# Patient Record
Sex: FEMALE | Race: WHITE | NOT HISPANIC OR LATINO | Employment: OTHER | URBAN - METROPOLITAN AREA
[De-identification: names, ages, dates, MRNs, and addresses within clinical notes are randomized per-mention and may not be internally consistent; named-entity substitution may affect disease eponyms.]

---

## 2017-01-03 RX ORDER — LEVOTHYROXINE SODIUM 0.1 MG/1
TABLET ORAL
Qty: 90 TABLET | Refills: 0 | Status: SHIPPED | OUTPATIENT
Start: 2017-01-03 | End: 2017-05-07 | Stop reason: SDUPTHER

## 2017-05-08 RX ORDER — LEVOTHYROXINE SODIUM 0.1 MG/1
100 TABLET ORAL DAILY
Qty: 30 TABLET | Refills: 0 | Status: SHIPPED | OUTPATIENT
Start: 2017-05-08 | End: 2017-07-26 | Stop reason: SDUPTHER

## 2017-06-21 ENCOUNTER — OFFICE VISIT (OUTPATIENT)
Dept: FAMILY MEDICINE CLINIC | Facility: CLINIC | Age: 82
End: 2017-06-21

## 2017-06-21 VITALS
WEIGHT: 126 LBS | HEART RATE: 86 BPM | HEIGHT: 63 IN | DIASTOLIC BLOOD PRESSURE: 74 MMHG | OXYGEN SATURATION: 91 % | SYSTOLIC BLOOD PRESSURE: 150 MMHG | TEMPERATURE: 98.2 F | RESPIRATION RATE: 16 BRPM | BODY MASS INDEX: 22.32 KG/M2

## 2017-06-21 DIAGNOSIS — M15.9 GENERALIZED OSTEOARTHRITIS: ICD-10-CM

## 2017-06-21 DIAGNOSIS — J30.89 PERENNIAL ALLERGIC RHINITIS, UNSPECIFIED ALLERGIC RHINITIS TRIGGER: ICD-10-CM

## 2017-06-21 DIAGNOSIS — Z00.00 HEALTH CARE MAINTENANCE: ICD-10-CM

## 2017-06-21 DIAGNOSIS — E03.9 ACQUIRED HYPOTHYROIDISM: Primary | ICD-10-CM

## 2017-06-21 DIAGNOSIS — Z79.899 HIGH RISK MEDICATION USE: ICD-10-CM

## 2017-06-21 LAB
ALBUMIN SERPL-MCNC: 4.2 G/DL (ref 3.5–5.2)
ALBUMIN/GLOB SERPL: 1.5 G/DL
ALP SERPL-CCNC: 97 U/L (ref 39–117)
ALT SERPL-CCNC: 10 U/L (ref 1–33)
AST SERPL-CCNC: 22 U/L (ref 1–32)
BASOPHILS # BLD AUTO: 0.06 10*3/MM3 (ref 0–0.2)
BASOPHILS NFR BLD AUTO: 0.7 % (ref 0–1.5)
BILIRUB SERPL-MCNC: 0.4 MG/DL (ref 0.1–1.2)
BUN SERPL-MCNC: 16 MG/DL (ref 8–23)
BUN/CREAT SERPL: 21.6 (ref 7–25)
CALCIUM SERPL-MCNC: 10 MG/DL (ref 8.2–9.6)
CHLORIDE SERPL-SCNC: 101 MMOL/L (ref 98–107)
CO2 SERPL-SCNC: 26 MMOL/L (ref 22–29)
CREAT SERPL-MCNC: 0.74 MG/DL (ref 0.57–1)
EOSINOPHIL # BLD AUTO: 0.07 10*3/MM3 (ref 0–0.7)
EOSINOPHIL NFR BLD AUTO: 0.8 % (ref 0.3–6.2)
ERYTHROCYTE [DISTWIDTH] IN BLOOD BY AUTOMATED COUNT: 14.7 % (ref 11.7–13)
GLOBULIN SER CALC-MCNC: 2.8 GM/DL
GLUCOSE SERPL-MCNC: 93 MG/DL (ref 65–99)
HCT VFR BLD AUTO: 40 % (ref 35.6–45.5)
HGB BLD-MCNC: 13.1 G/DL (ref 11.9–15.5)
IMM GRANULOCYTES # BLD: 0.03 10*3/MM3 (ref 0–0.03)
IMM GRANULOCYTES NFR BLD: 0.4 % (ref 0–0.5)
LYMPHOCYTES # BLD AUTO: 1.32 10*3/MM3 (ref 0.9–4.8)
LYMPHOCYTES NFR BLD AUTO: 15.6 % (ref 19.6–45.3)
MCH RBC QN AUTO: 32.1 PG (ref 26.9–32)
MCHC RBC AUTO-ENTMCNC: 32.8 G/DL (ref 32.4–36.3)
MCV RBC AUTO: 98 FL (ref 80.5–98.2)
MONOCYTES # BLD AUTO: 0.91 10*3/MM3 (ref 0.2–1.2)
MONOCYTES NFR BLD AUTO: 10.8 % (ref 5–12)
NEUTROPHILS # BLD AUTO: 6.06 10*3/MM3 (ref 1.9–8.1)
NEUTROPHILS NFR BLD AUTO: 71.7 % (ref 42.7–76)
PLATELET # BLD AUTO: 235 10*3/MM3 (ref 140–500)
POTASSIUM SERPL-SCNC: 4 MMOL/L (ref 3.5–5.2)
PROT SERPL-MCNC: 7 G/DL (ref 6–8.5)
RBC # BLD AUTO: 4.08 10*6/MM3 (ref 3.9–5.2)
SODIUM SERPL-SCNC: 143 MMOL/L (ref 136–145)
T4 FREE SERPL-MCNC: 1.5 NG/DL (ref 0.93–1.7)
TSH SERPL DL<=0.005 MIU/L-ACNC: 1.07 MIU/ML (ref 0.27–4.2)
WBC # BLD AUTO: 8.45 10*3/MM3 (ref 4.5–10.7)

## 2017-06-21 PROCEDURE — 99214 OFFICE O/P EST MOD 30 MIN: CPT | Performed by: FAMILY MEDICINE

## 2017-06-21 NOTE — PROGRESS NOTES
LISE Mayberry is a 89 y.o. female who is here for follow up of hypothyroidism and generalized arthritis.  Continues with allergy symptoms occasionally causing fluid buildup in her ears.  Otherwise denies any new complaints.  Does have concerns about general psychosocial problems etc.      Review of Systems   Endocrine: Positive for polyuria.   Musculoskeletal: Positive for arthralgias.   Allergic/Immunologic: Positive for environmental allergies.   All other systems reviewed and are negative.        No past medical history on file.    No past surgical history on file.    No family history on file.    Social History     Social History   • Marital status: Single     Spouse name: N/A   • Number of children: N/A   • Years of education: N/A     Occupational History   • Not on file.     Social History Main Topics   • Smoking status: Not on file   • Smokeless tobacco: Not on file   • Alcohol use Not on file   • Drug use: Not on file   • Sexual activity: Not on file     Other Topics Concern   • Not on file     Social History Narrative         Physical Exam   Constitutional: She is oriented to person, place, and time. She appears well-developed and well-nourished. No distress.   HENT:   Head: Normocephalic.   Right Ear: Hearing, tympanic membrane and ear canal normal.   Left Ear: Hearing, tympanic membrane and ear canal normal.   Mouth/Throat: Oropharynx is clear and moist.   Eyes: Conjunctivae are normal. Pupils are equal, round, and reactive to light.   Neck: Normal range of motion. Neck supple. No JVD present. No thyromegaly present.   Cardiovascular: Normal rate, regular rhythm and normal heart sounds.    Pulmonary/Chest: Effort normal. No respiratory distress. She has no rales.   Abdominal: Soft. She exhibits no distension. There is no tenderness.   Musculoskeletal: She exhibits deformity. She exhibits no edema.   Osteoarthritic changes   Neurological: She is alert and oriented to person, place, and time. She  exhibits normal muscle tone. Coordination normal.   Skin: Skin is warm and dry.   Psychiatric: She has a normal mood and affect. Her behavior is normal. Judgment and thought content normal.   Nursing note and vitals reviewed.        Assessment/Plan    Bonnie was seen today for hypothyroidism and osteoarthritis.    Diagnoses and all orders for this visit:    Acquired hypothyroidism  -     TSH+Free T4    Generalized osteoarthritis    Health care maintenance  -     CBC & Differential  -     Comprehensive Metabolic Panel    Perennial allergic rhinitis, unspecified allergic rhinitis trigger        Patient is here for routine follow-up of above-noted medical problems.  Overall status seems stable on current medications.  Will continue current therapy including follow-up in 6 months.

## 2017-07-26 RX ORDER — LEVOTHYROXINE SODIUM 0.1 MG/1
TABLET ORAL
Qty: 30 TABLET | Refills: 0 | Status: SHIPPED | OUTPATIENT
Start: 2017-07-26 | End: 2017-08-23 | Stop reason: SDUPTHER

## 2017-08-23 RX ORDER — LEVOTHYROXINE SODIUM 0.1 MG/1
TABLET ORAL
Qty: 30 TABLET | Refills: 0 | Status: SHIPPED | OUTPATIENT
Start: 2017-08-23 | End: 2017-09-20 | Stop reason: SDUPTHER

## 2017-09-20 RX ORDER — LEVOTHYROXINE SODIUM 0.1 MG/1
TABLET ORAL
Qty: 30 TABLET | Refills: 0 | Status: CANCELLED | OUTPATIENT
Start: 2017-09-20

## 2017-09-20 RX ORDER — LEVOTHYROXINE SODIUM 0.1 MG/1
TABLET ORAL
Qty: 30 TABLET | Refills: 3 | Status: SHIPPED | OUTPATIENT
Start: 2017-09-20 | End: 2017-11-27 | Stop reason: SDUPTHER

## 2017-10-04 ENCOUNTER — TELEPHONE (OUTPATIENT)
Dept: ORTHOPEDIC SURGERY | Facility: CLINIC | Age: 82
End: 2017-10-04

## 2017-10-06 ENCOUNTER — OFFICE VISIT (OUTPATIENT)
Dept: ORTHOPEDIC SURGERY | Facility: CLINIC | Age: 82
End: 2017-10-06

## 2017-10-06 VITALS — WEIGHT: 124 LBS | HEIGHT: 63 IN | TEMPERATURE: 98.7 F | BODY MASS INDEX: 21.97 KG/M2

## 2017-10-06 DIAGNOSIS — Z91.81 HISTORY OF FALL: Primary | ICD-10-CM

## 2017-10-06 DIAGNOSIS — S43.101A SEPARATION OF RIGHT ACROMIOCLAVICULAR JOINT, INITIAL ENCOUNTER: ICD-10-CM

## 2017-10-06 DIAGNOSIS — M25.511 RIGHT SHOULDER PAIN, UNSPECIFIED CHRONICITY: ICD-10-CM

## 2017-10-06 PROCEDURE — 73030 X-RAY EXAM OF SHOULDER: CPT | Performed by: ORTHOPAEDIC SURGERY

## 2017-10-06 PROCEDURE — 99203 OFFICE O/P NEW LOW 30 MIN: CPT | Performed by: ORTHOPAEDIC SURGERY

## 2017-10-06 RX ORDER — IBUPROFEN 600 MG/1
600 TABLET ORAL
COMMUNITY
Start: 2017-10-03 | End: 2017-10-13

## 2017-10-06 NOTE — PROGRESS NOTES
New Right Shoulder      Patient: Bonnie Mayberry        YOB: 1925    Medical Record Number: [id number]        Chief Complaints: Right Shoulder pain    History of Present Illness: This is a  89 y.o. female who presents with complaints of right shoulder pain.  She does have known arthritis in her shoulder but she fell on 10/3/17 injuring her shoulder.  She fell in the driveway she was seen at urgent care and was found to have a acromioclavicular separation.  Her symptoms are moderate intermittent aching he states the put her in a sling but it had straps everywhere and she couldn't get on her off.  She does live by herself she is retired her past medical histories are marked for osteopenia and thyroid disease      Allergies:   Allergies   Allergen Reactions   • Sulfa Antibiotics        Medications:   Home Medications:  Current Outpatient Prescriptions on File Prior to Visit   Medication Sig   • levothyroxine (SYNTHROID, LEVOTHROID) 100 MCG tablet Take one daily   • loratadine (CLARITIN) 10 MG tablet Take 1 tablet by mouth daily.     No current facility-administered medications on file prior to visit.      Current Medications:  Scheduled Meds:  Continuous Infusions:  No current facility-administered medications for this visit.   PRN Meds:.    No past medical history on file.   No past surgical history on file.     Social History     Occupational History   • Not on file.     Social History Main Topics   • Smoking status: Never Smoker   • Smokeless tobacco: Never Used   • Alcohol use Not on file   • Drug use: Not on file   • Sexual activity: Not on file    Social History     Social History Narrative   • No narrative on file      No family history on file.          Review of Systems: 14 point review of systems are remarkable for the pertinent positives listed in the chart by the patient the remainder are negative    Review of Systems      Physical Exam: 89 y.o. female  General Appearance:    Alert,  "cooperative, in no acute distress                 Vitals:    10/06/17 1419   Temp: 98.7 °F (37.1 °C)   TempSrc: Temporal Artery    Weight: 124 lb (56.2 kg)   Height: 63\" (160 cm)      Patient is alert and read ×3 no acute distress appears her above-listed at height weight and age.  Affect is normal respiratory rate is normal unlabored. Heart rate regular rate rhythm, sclera, dentition and hearing are normal for the purpose of this exam.    Ortho Exam physical exam the right shoulder she has a prominent clavicle overlying skin looks okay she has mild palpable tenderness in that area as well.  She can active flex to about 1:30 rotator cuff strength is 4+ over 5 with marked crepitus.  External rotation is extremely limited about 10°    Procedures          Radiology:   AP, Scapular Y and Axillary Lateral of the right a shoulder were ordered/reviewed to evauate shoulder pain.  She does have marked degenerative changes about the glenohumeral joint with flattening of the humeral head she also has a grade 3 acromial clavicular separation with no obvious fracture    Assessment/Plan:    Grade 3 acromioclavicular separation we are obvious E not an operate on this her skin is fine I will put her in a sling to take the weight of her arm off her shoulder I will give her some Voltaren gel want her to ice this see her back in 3-4 weeks.                        "

## 2017-10-09 ENCOUNTER — TELEPHONE (OUTPATIENT)
Dept: ORTHOPEDIC SURGERY | Facility: CLINIC | Age: 82
End: 2017-10-09

## 2017-11-27 ENCOUNTER — OFFICE VISIT (OUTPATIENT)
Dept: FAMILY MEDICINE CLINIC | Facility: CLINIC | Age: 82
End: 2017-11-27

## 2017-11-27 VITALS
HEIGHT: 63 IN | RESPIRATION RATE: 16 BRPM | OXYGEN SATURATION: 98 % | HEART RATE: 71 BPM | TEMPERATURE: 97.9 F | DIASTOLIC BLOOD PRESSURE: 78 MMHG | SYSTOLIC BLOOD PRESSURE: 170 MMHG | WEIGHT: 120.4 LBS | BODY MASS INDEX: 21.33 KG/M2

## 2017-11-27 DIAGNOSIS — Z79.899 HIGH RISK MEDICATION USE: ICD-10-CM

## 2017-11-27 DIAGNOSIS — E03.9 ACQUIRED HYPOTHYROIDISM: Primary | ICD-10-CM

## 2017-11-27 DIAGNOSIS — M15.9 GENERALIZED OSTEOARTHRITIS: ICD-10-CM

## 2017-11-27 PROCEDURE — 99213 OFFICE O/P EST LOW 20 MIN: CPT | Performed by: FAMILY MEDICINE

## 2017-11-27 RX ORDER — LEVOTHYROXINE SODIUM 0.1 MG/1
TABLET ORAL
Qty: 90 TABLET | Refills: 3 | Status: SHIPPED | OUTPATIENT
Start: 2017-11-27 | End: 2018-01-01 | Stop reason: SDUPTHER

## 2017-11-27 RX ORDER — IBUPROFEN 600 MG/1
600 TABLET ORAL EVERY 6 HOURS PRN
COMMUNITY
Start: 2017-10-03 | End: 2019-01-01 | Stop reason: HOSPADM

## 2017-11-27 NOTE — PROGRESS NOTES
LISE Mayberry is a 89 y.o. female who is here for follow up of hypothyroidism and generalized arthritis.  Patient has dislocated right shoulder but decision has been made because of her age etc. no intervention is indicated.  Continues to have urinary urgency and frequency but this basically is unchanged.  Uses ibuprofen up to 600 mg at a time for relief of arthritic discomfort.  Risks versus benefits discussed.  Basically at her age she feels comfort outweighs potential risks.      Review of Systems   Constitutional: Negative for chills, fatigue and fever.   Respiratory: Negative for shortness of breath.    Cardiovascular: Negative for leg swelling.   Endocrine: Positive for polyuria.   Genitourinary: Positive for frequency and urgency.   Musculoskeletal: Positive for joint swelling, myalgias, neck pain and neck stiffness.   Allergic/Immunologic: Positive for environmental allergies.   Psychiatric/Behavioral: Positive for agitation and sleep disturbance.   All other systems reviewed and are negative.        No past medical history on file.    No past surgical history on file.    No family history on file.    Social History     Social History   • Marital status: Single     Spouse name: N/A   • Number of children: N/A   • Years of education: N/A     Occupational History   • Not on file.     Social History Main Topics   • Smoking status: Never Smoker   • Smokeless tobacco: Never Used   • Alcohol use Not on file   • Drug use: Not on file   • Sexual activity: Not on file     Other Topics Concern   • Not on file     Social History Narrative   • No narrative on file         Physical Exam   Constitutional: She is oriented to person, place, and time. She appears well-developed and well-nourished.   HENT:   Head: Normocephalic.   Nose: Nose normal.   Mouth/Throat: Oropharynx is clear and moist.   Eyes: Conjunctivae and EOM are normal. Pupils are equal, round, and reactive to light.   Neck: Normal range of motion.  Neck supple. No JVD present. No thyromegaly present.   Cardiovascular: Normal rate and regular rhythm.    Pulmonary/Chest: Effort normal. No respiratory distress.   Abdominal: Soft. She exhibits no distension. There is no tenderness.   Musculoskeletal: She exhibits deformity. She exhibits no edema or tenderness.        Right shoulder: She exhibits decreased range of motion and deformity. She exhibits no tenderness, no bony tenderness and no swelling.   Lymphadenopathy:     She has no cervical adenopathy.   Neurological: She is alert and oriented to person, place, and time. She exhibits normal muscle tone. Coordination normal.   Skin: Skin is warm and dry.   Psychiatric: She has a normal mood and affect. Her behavior is normal. Judgment and thought content normal.   Nursing note and vitals reviewed.        Assessment/Plan    Bonnie was seen today for hypothyroidism.    Diagnoses and all orders for this visit:    Acquired hypothyroidism  -     TSH+Free T4    Generalized osteoarthritis    High risk medication use  -     CBC & Differential  -     Comprehensive Metabolic Panel    Other orders  -     levothyroxine (SYNTHROID, LEVOTHROID) 100 MCG tablet; Take one daily      Patient is here for routine follow-up especially of generalized arthritis and hypothyroidism.  Has a dislocated right shoulder but treatment not indicated in view of age and minimal discomfort controlled with ibuprofen.  Lives alone and overall doing well on current regimen.  Is here with her daughter.  Continue current therapy and follow-up in 6 months or as needed.    This note includes information entered using a voice recognition dictation system.  Though reviewed, some nonsensible errors may remain.

## 2017-11-28 LAB
ALBUMIN SERPL-MCNC: 4.6 G/DL (ref 3.5–5.2)
ALBUMIN/GLOB SERPL: 1.7 G/DL
ALP SERPL-CCNC: 115 U/L (ref 39–117)
ALT SERPL-CCNC: 8 U/L (ref 1–33)
AST SERPL-CCNC: 18 U/L (ref 1–32)
BASOPHILS # BLD AUTO: 0.05 10*3/MM3 (ref 0–0.2)
BASOPHILS NFR BLD AUTO: 0.5 % (ref 0–1.5)
BILIRUB SERPL-MCNC: 0.4 MG/DL (ref 0.1–1.2)
BUN SERPL-MCNC: 18 MG/DL (ref 8–23)
BUN/CREAT SERPL: 25 (ref 7–25)
CALCIUM SERPL-MCNC: 10.3 MG/DL (ref 8.2–9.6)
CHLORIDE SERPL-SCNC: 100 MMOL/L (ref 98–107)
CO2 SERPL-SCNC: 29.8 MMOL/L (ref 22–29)
CREAT SERPL-MCNC: 0.72 MG/DL (ref 0.57–1)
EOSINOPHIL # BLD AUTO: 0.08 10*3/MM3 (ref 0–0.7)
EOSINOPHIL NFR BLD AUTO: 0.8 % (ref 0.3–6.2)
ERYTHROCYTE [DISTWIDTH] IN BLOOD BY AUTOMATED COUNT: 14.7 % (ref 11.7–13)
GFR SERPLBLD CREATININE-BSD FMLA CKD-EPI: 76 ML/MIN/1.73
GFR SERPLBLD CREATININE-BSD FMLA CKD-EPI: 92 ML/MIN/1.73
GLOBULIN SER CALC-MCNC: 2.7 GM/DL
GLUCOSE SERPL-MCNC: 85 MG/DL (ref 65–99)
HCT VFR BLD AUTO: 40.7 % (ref 35.6–45.5)
HGB BLD-MCNC: 12.9 G/DL (ref 11.9–15.5)
IMM GRANULOCYTES # BLD: 0.02 10*3/MM3 (ref 0–0.03)
IMM GRANULOCYTES NFR BLD: 0.2 % (ref 0–0.5)
LYMPHOCYTES # BLD AUTO: 1.27 10*3/MM3 (ref 0.9–4.8)
LYMPHOCYTES NFR BLD AUTO: 13.2 % (ref 19.6–45.3)
MCH RBC QN AUTO: 32.3 PG (ref 26.9–32)
MCHC RBC AUTO-ENTMCNC: 31.7 G/DL (ref 32.4–36.3)
MCV RBC AUTO: 101.8 FL (ref 80.5–98.2)
MONOCYTES # BLD AUTO: 0.82 10*3/MM3 (ref 0.2–1.2)
MONOCYTES NFR BLD AUTO: 8.6 % (ref 5–12)
NEUTROPHILS # BLD AUTO: 7.35 10*3/MM3 (ref 1.9–8.1)
NEUTROPHILS NFR BLD AUTO: 76.7 % (ref 42.7–76)
PLATELET # BLD AUTO: 237 10*3/MM3 (ref 140–500)
POTASSIUM SERPL-SCNC: 4.2 MMOL/L (ref 3.5–5.2)
PROT SERPL-MCNC: 7.3 G/DL (ref 6–8.5)
RBC # BLD AUTO: 4 10*6/MM3 (ref 3.9–5.2)
SODIUM SERPL-SCNC: 140 MMOL/L (ref 136–145)
T4 FREE SERPL-MCNC: 1.54 NG/DL (ref 0.93–1.7)
TSH SERPL DL<=0.005 MIU/L-ACNC: 0.81 MIU/ML (ref 0.27–4.2)
WBC # BLD AUTO: 9.59 10*3/MM3 (ref 4.5–10.7)

## 2018-01-01 RX ORDER — LEVOTHYROXINE SODIUM 0.1 MG/1
TABLET ORAL
Qty: 30 TABLET | Refills: 0 | Status: SHIPPED | OUTPATIENT
Start: 2018-01-01 | End: 2019-01-01 | Stop reason: SDUPTHER

## 2018-03-29 ENCOUNTER — HOSPITAL ENCOUNTER (EMERGENCY)
Facility: HOSPITAL | Age: 83
Discharge: HOME | End: 2018-03-30
Attending: EMERGENCY MEDICINE | Admitting: EMERGENCY MEDICINE

## 2018-03-29 DIAGNOSIS — M62.82 NON-TRAUMATIC RHABDOMYOLYSIS: ICD-10-CM
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
## 2018-03-29 LAB
ALBUMIN SERPL-MCNC: 3.5 G/DL (ref 3.5–5.2)
ALBUMIN/GLOB SERPL: 1.5 G/DL
ALP SERPL-CCNC: 93 U/L (ref 39–117)
ALT SERPL W P-5'-P-CCNC: 12 U/L (ref 1–33)
ANION GAP SERPL CALCULATED.3IONS-SCNC: 13.4 MMOL/L
AST SERPL-CCNC: 31 U/L (ref 1–32)
BACTERIA UR QL AUTO: ABNORMAL /HPF
BASOPHILS # BLD AUTO: 0.04 10*3/MM3 (ref 0–0.2)
BASOPHILS NFR BLD AUTO: 0.2 % (ref 0–1.5)
BILIRUB SERPL-MCNC: 0.5 MG/DL (ref 0.1–1.2)
BILIRUB UR QL STRIP: NEGATIVE
BUN BLD-MCNC: 14 MG/DL (ref 8–23)
BUN/CREAT SERPL: 28.6 (ref 7–25)
CALCIUM SPEC-SCNC: 8.6 MG/DL (ref 8.2–9.6)
CHLORIDE SERPL-SCNC: 99 MMOL/L (ref 98–107)
CK SERPL-CCNC: 578 U/L (ref 20–180)
CLARITY UR: CLEAR
CO2 SERPL-SCNC: 23.6 MMOL/L (ref 22–29)
COLOR UR: YELLOW
CREAT BLD-MCNC: 0.49 MG/DL (ref 0.57–1)
DEPRECATED RDW RBC AUTO: 52.6 FL (ref 37–54)
EOSINOPHIL # BLD AUTO: 0.01 10*3/MM3 (ref 0–0.7)
EOSINOPHIL NFR BLD AUTO: 0.1 % (ref 0.3–6.2)
ERYTHROCYTE [DISTWIDTH] IN BLOOD BY AUTOMATED COUNT: 14.5 % (ref 11.7–13)
GFR SERPL CREATININE-BSD FRML MDRD: 118 ML/MIN/1.73
GLOBULIN UR ELPH-MCNC: 2.4 GM/DL
GLUCOSE BLD-MCNC: 121 MG/DL (ref 65–99)
GLUCOSE UR STRIP-MCNC: NEGATIVE MG/DL
HCT VFR BLD AUTO: 38.1 % (ref 35.6–45.5)
HGB BLD-MCNC: 12.2 G/DL (ref 11.9–15.5)
HGB UR QL STRIP.AUTO: ABNORMAL
HYALINE CASTS UR QL AUTO: ABNORMAL /LPF
IMM GRANULOCYTES # BLD: 0.06 10*3/MM3 (ref 0–0.03)
IMM GRANULOCYTES NFR BLD: 0.4 % (ref 0–0.5)
KETONES UR QL STRIP: ABNORMAL
LEUKOCYTE ESTERASE UR QL STRIP.AUTO: NEGATIVE
LYMPHOCYTES # BLD AUTO: 1.52 10*3/MM3 (ref 0.9–4.8)
LYMPHOCYTES NFR BLD AUTO: 9.4 % (ref 19.6–45.3)
MAGNESIUM SERPL-MCNC: 2 MG/DL (ref 1.7–2.3)
MCH RBC QN AUTO: 31.9 PG (ref 26.9–32)
MCHC RBC AUTO-ENTMCNC: 32 G/DL (ref 32.4–36.3)
MCV RBC AUTO: 99.7 FL (ref 80.5–98.2)
MONOCYTES # BLD AUTO: 0.99 10*3/MM3 (ref 0.2–1.2)
MONOCYTES NFR BLD AUTO: 6.1 % (ref 5–12)
NEUTROPHILS # BLD AUTO: 13.57 10*3/MM3 (ref 1.9–8.1)
NEUTROPHILS NFR BLD AUTO: 83.8 % (ref 42.7–76)
NITRITE UR QL STRIP: NEGATIVE
PH UR STRIP.AUTO: 7 [PH] (ref 5–8)
PLATELET # BLD AUTO: 189 10*3/MM3 (ref 140–500)
PMV BLD AUTO: 11.6 FL (ref 6–12)
POTASSIUM BLD-SCNC: 3.4 MMOL/L (ref 3.5–5.2)
PROT SERPL-MCNC: 5.9 G/DL (ref 6–8.5)
PROT UR QL STRIP: NEGATIVE
RBC # BLD AUTO: 3.82 10*6/MM3 (ref 3.9–5.2)
RBC # UR: ABNORMAL /HPF
REF LAB TEST METHOD: ABNORMAL
SODIUM BLD-SCNC: 136 MMOL/L (ref 136–145)
SP GR UR STRIP: 1.01 (ref 1–1.03)
SQUAMOUS #/AREA URNS HPF: ABNORMAL /HPF
TSH SERPL DL<=0.05 MIU/L-ACNC: 0.53 MIU/ML (ref 0.27–4.2)
UROBILINOGEN UR QL STRIP: ABNORMAL
WBC NRBC COR # BLD: 16.19 10*3/MM3 (ref 4.5–10.7)
WBC UR QL AUTO: ABNORMAL /HPF

## 2018-03-29 PROCEDURE — 84443 ASSAY THYROID STIM HORMONE: CPT | Performed by: PHYSICIAN ASSISTANT

## 2018-03-29 PROCEDURE — 81001 URINALYSIS AUTO W/SCOPE: CPT | Performed by: PHYSICIAN ASSISTANT

## 2018-03-29 PROCEDURE — 96361 HYDRATE IV INFUSION ADD-ON: CPT

## 2018-03-29 PROCEDURE — 82550 ASSAY OF CK (CPK): CPT | Performed by: PHYSICIAN ASSISTANT

## 2018-03-29 PROCEDURE — 96374 THER/PROPH/DIAG INJ IV PUSH: CPT

## 2018-03-29 PROCEDURE — 25010000002 KETOROLAC TROMETHAMINE PER 15 MG: Performed by: PHYSICIAN ASSISTANT

## 2018-03-29 PROCEDURE — 83735 ASSAY OF MAGNESIUM: CPT | Performed by: PHYSICIAN ASSISTANT

## 2018-03-29 PROCEDURE — 99284 EMERGENCY DEPT VISIT MOD MDM: CPT

## 2018-03-29 PROCEDURE — 85025 COMPLETE CBC W/AUTO DIFF WBC: CPT | Performed by: PHYSICIAN ASSISTANT

## 2018-03-29 PROCEDURE — 80053 COMPREHEN METABOLIC PANEL: CPT | Performed by: PHYSICIAN ASSISTANT

## 2018-03-29 RX ORDER — KETOROLAC TROMETHAMINE 30 MG/ML
15 INJECTION, SOLUTION INTRAMUSCULAR; INTRAVENOUS ONCE
Status: COMPLETED | OUTPATIENT
Start: 2018-03-29 | End: 2018-03-29

## 2018-03-29 RX ORDER — SODIUM CHLORIDE 0.9 % (FLUSH) 0.9 %
10 SYRINGE (ML) INJECTION AS NEEDED
Status: DISCONTINUED | OUTPATIENT
Start: 2018-03-29 | End: 2018-03-30 | Stop reason: HOSPADM

## 2018-03-29 RX ADMIN — SODIUM CHLORIDE 1000 ML: 9 INJECTION, SOLUTION INTRAVENOUS at 22:23

## 2018-03-29 RX ADMIN — KETOROLAC TROMETHAMINE 15 MG: 30 INJECTION, SOLUTION INTRAMUSCULAR at 23:50

## 2018-03-30 VITALS
WEIGHT: 120 LBS | TEMPERATURE: 97.6 F | HEIGHT: 60 IN | DIASTOLIC BLOOD PRESSURE: 95 MMHG | HEART RATE: 78 BPM | BODY MASS INDEX: 23.56 KG/M2 | RESPIRATION RATE: 16 BRPM | OXYGEN SATURATION: 96 % | SYSTOLIC BLOOD PRESSURE: 124 MMHG

## 2018-04-13 PROCEDURE — 99284 EMERGENCY DEPT VISIT MOD MDM: CPT

## 2018-04-14 ENCOUNTER — HOSPITAL ENCOUNTER (EMERGENCY)
Facility: HOSPITAL | Age: 83
Discharge: HOME | End: 2018-04-14
Attending: EMERGENCY MEDICINE | Admitting: EMERGENCY MEDICINE

## 2018-04-14 VITALS
HEIGHT: 63 IN | WEIGHT: 120 LBS | OXYGEN SATURATION: 97 % | DIASTOLIC BLOOD PRESSURE: 76 MMHG | HEART RATE: 90 BPM | TEMPERATURE: 99.7 F | SYSTOLIC BLOOD PRESSURE: 178 MMHG | BODY MASS INDEX: 21.26 KG/M2 | RESPIRATION RATE: 16 BRPM

## 2018-04-14 DIAGNOSIS — R29.6 FREQUENT FALLS: Primary | ICD-10-CM

## 2018-04-14 DIAGNOSIS — M19.90 ARTHRITIS: ICD-10-CM

## 2018-04-14 RX ORDER — IBUPROFEN 200 MG
400 TABLET ORAL ONCE
Status: COMPLETED | OUTPATIENT
Start: 2018-04-14 | End: 2018-04-14

## 2018-04-14 RX ADMIN — IBUPROFEN 400 MG: 200 TABLET, FILM COATED ORAL at 01:11

## 2018-04-14 NOTE — ED NOTES
"Pt arrived via EMS.  Pt lives at home alone and states that she \"slid\" out of recliner. On arrival, EMS states that patient was all the way across the room away from recliner.  EMS states she was A&O x3 but became combative.  Pt does not know how long she had been down.     Sarai Brothers RN  04/13/18 8131    "

## 2018-04-14 NOTE — ED PROVIDER NOTES
" EMERGENCY DEPARTMENT ENCOUNTER    CHIEF COMPLAINT  Chief Complaint: Fall  History given by: Pt  History limited by: Nothing  Room Number: 34/34  PMD: Patricio Maldonado MD    HPI:  Pt is a 89 y.o. female who presents complaining of fall secondary to sliding out of her recliner onset PTA. She denies head injury or LOC.  Pt complains of R shoulder pain but denies any other symptoms at this time.    Duration/Onset/Timing: PTA/ sudden/ constant  Location: n/a  Radiation: none  Quality: \"fall\"  Intensity/Severity: moderate  Associated Symptoms: R shoulder pain  Aggravating or Alleviating Factors: none  Previous Episodes: none      PAST MEDICAL HISTORY  Active Ambulatory Problems     Diagnosis Date Noted   • Hypothyroidism 04/20/2016   • Generalized osteoarthritis 04/20/2016   • Urinary incontinence 04/20/2016     Resolved Ambulatory Problems     Diagnosis Date Noted   • Anemia 04/20/2016   • Impacted cerumen 04/20/2016     Past Medical History:   Diagnosis Date   • Arthritis    • Disease of thyroid gland        PAST SURGICAL HISTORY  Past Surgical History:   Procedure Laterality Date   • ADENOIDECTOMY     • APPENDECTOMY     • HYSTERECTOMY     • TONSILLECTOMY         FAMILY HISTORY  History reviewed. No pertinent family history.    SOCIAL HISTORY  Social History     Social History   • Marital status: Single     Spouse name: N/A   • Number of children: N/A   • Years of education: N/A     Occupational History   • Not on file.     Social History Main Topics   • Smoking status: Never Smoker   • Smokeless tobacco: Never Used   • Alcohol use No   • Drug use: No   • Sexual activity: Defer     Other Topics Concern   • Not on file     Social History Narrative   • No narrative on file       ALLERGIES  Sulfa antibiotics    REVIEW OF SYSTEMS  Review of Systems   Constitutional: Negative for fever.   Respiratory: Negative for shortness of breath.    Cardiovascular: Negative for chest pain.   Musculoskeletal: Positive for arthralgias " "(R shoulder secondary to chronic/ fall).       PHYSICAL EXAM  ED Triage Vitals [04/13/18 2355]   Temp Heart Rate Resp BP SpO2   99.7 °F (37.6 °C) 110 20 140/86 98 %      Temp src Heart Rate Source Patient Position BP Location FiO2 (%)   Tympanic -- Lying Right arm --       Physical Exam   Constitutional: No distress.   HENT:   Head: Normocephalic and atraumatic.   Cardiovascular: Regular rhythm.    Pulmonary/Chest: No respiratory distress.   Abdominal: There is no tenderness.   Musculoskeletal: She exhibits no tenderness.   Skin: No rash noted.   Nursing note and vitals reviewed.        PROCEDURES  Procedures      PROGRESS AND CONSULTS  ED Course   Comment By Time   12:35 AM  89 yo slid and fell without obvious injury - only complaint is chronic \"arthritis pain\".  Exam is unremarkable and pt is coherent and clear.  Family wants her admitted to go to a nursing home, pt adamantly refuses NH or admission.  I cannot hold pt against her will or force NHP.  D/W family about applying to guardianship if they feel pt is a risk to self. Jaime Mooney MD 04/14 0035     0032 Ordered 400mg ibuprofen for pain relief    0050 Pt has been ambulatory in the ED without difficulty    0126 Pt's family requests that the pt see behavioral health to discuss guardianship of the pt. Will consult ACCESS.    0206 Pt's family would now like the pt to be discharged. Suggested pt follow up with her PCP. Pt understands and agrees with treatment. All questions and concerns were addressed at this time.    MEDICAL DECISION MAKING  Results were reviewed/discussed with the patient and they were also made aware of online access. Pt also made aware that some labs, such as cultures, will not be resulted during ER visit and follow up with PMD is necessary.     MDM       DIAGNOSIS  Final diagnoses:   Frequent falls   Arthritis       DISPOSITION  DISCHARGE    Patient discharged in stable condition.    Reviewed implications of results, diagnosis, meds, " responsibility to follow up, warning signs and symptoms of possible worsening, potential complications and reasons to return to ER, including new or worsening symptoms.    Patient/Family voiced understanding of above instructions.    Discussed plan for discharge, as there is no emergent indication for admission. Patient referred to primary care provider for BP management due to today's BP. Pt/family is agreeable and understands need for follow up and repeat testing.  Pt is aware that discharge does not mean that nothing is wrong but it indicates no emergency is present that requires admission and they must continue care with follow-up as given below or physician of their choice.     FOLLOW-UP  Patricio Maldonado MD  [address] [facility] [address]  [phone]    In 2 days  If Not Better         Medication List      No changes were made to your prescriptions during this visit.           Latest Documented Vital Signs:  As of 2:08 AM  BP- (!) 190/78 HR- 84 Temp- 99.7 °F (37.6 °C) (Tympanic) O2 sat- 98%    --  Documentation assistance provided by karl Monzon for Dr. Mooney.  Information recorded by the scribe was done at my direction and has been verified and validated by me.     Sravanthi Monzon  04/14/18 0208       Jaime Mooney MD  04/14/18 0224

## 2018-04-14 NOTE — ED NOTES
"Pt here via ems on LSB with c-collar in place - pt states she slid out of her recliner while trying to get a pillow behind her. Pt denies falling.  EMS states pt was found across the room from the reclincer - pt states she was scooting around because her back door was open and \"it was getting dark.\"  Pt denies pain, except for her right shoulder (\"it's arthritis\").  Family is concerned because pt lives alone, and they states house is covered in feces. Bed in low position, call light within reach, side rails up X2. Pt is alert and oriented X4, PERRLA, respirations are even and unlabored, chest rise and fall is equal in expansion.  Pt does not appear to be in distress at this time     Mckenna Rodriguez RN  04/14/18 0024    " Pt wants x-ray results.    Telephone:      Telephone Information:   Mobile [phone]        Timeframe:  Today.

## 2018-04-14 NOTE — ED NOTES
PT ambulates to restroom with walker with steady gait and requests privacy to use the restroom. PT instructed to pull cord to notify staff when finished to assist in returning to room.      Denice Wagner RN  04/14/18 0209

## 2018-04-17 ENCOUNTER — TELEPHONE (OUTPATIENT)
Dept: SOCIAL WORK | Facility: HOSPITAL | Age: 83
End: 2018-04-17

## 2018-04-20 ENCOUNTER — OFFICE VISIT (OUTPATIENT)
Dept: FAMILY MEDICINE CLINIC | Facility: CLINIC | Age: 83
End: 2018-04-20

## 2018-04-20 VITALS
RESPIRATION RATE: 16 BRPM | TEMPERATURE: 97.9 F | BODY MASS INDEX: 20.48 KG/M2 | OXYGEN SATURATION: 94 % | DIASTOLIC BLOOD PRESSURE: 74 MMHG | HEART RATE: 119 BPM | HEIGHT: 63 IN | WEIGHT: 115.6 LBS | SYSTOLIC BLOOD PRESSURE: 122 MMHG

## 2018-04-20 DIAGNOSIS — M15.9 GENERALIZED OSTEOARTHRITIS: Primary | ICD-10-CM

## 2018-04-20 DIAGNOSIS — E03.9 ACQUIRED HYPOTHYROIDISM: ICD-10-CM

## 2018-04-20 PROCEDURE — 99213 OFFICE O/P EST LOW 20 MIN: CPT | Performed by: FAMILY MEDICINE
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
## 2018-07-20 ENCOUNTER — OFFICE VISIT (OUTPATIENT)
Dept: FAMILY MEDICINE CLINIC | Facility: CLINIC | Age: 83
End: 2018-07-20

## 2018-07-20 VITALS
HEIGHT: 63 IN | SYSTOLIC BLOOD PRESSURE: 122 MMHG | DIASTOLIC BLOOD PRESSURE: 88 MMHG | TEMPERATURE: 98.3 F | OXYGEN SATURATION: 98 % | HEART RATE: 103 BPM | BODY MASS INDEX: 18.34 KG/M2 | WEIGHT: 103.5 LBS

## 2018-07-20 DIAGNOSIS — R63.4 WEIGHT LOSS: ICD-10-CM

## 2018-07-20 DIAGNOSIS — E03.9 ACQUIRED HYPOTHYROIDISM: Primary | ICD-10-CM

## 2018-07-20 DIAGNOSIS — Z79.899 HIGH RISK MEDICATION USE: ICD-10-CM

## 2018-07-20 DIAGNOSIS — M15.9 GENERALIZED OSTEOARTHRITIS: ICD-10-CM

## 2018-07-20 LAB
ALBUMIN SERPL-MCNC: 4.6 G/DL (ref 3.5–5.2)
ALBUMIN/GLOB SERPL: 2 G/DL
ALP SERPL-CCNC: 96 U/L (ref 39–117)
ALT SERPL-CCNC: 8 U/L (ref 1–33)
AST SERPL-CCNC: 17 U/L (ref 1–32)
BASOPHILS # BLD AUTO: 0.1 10*3/MM3 (ref 0–0.2)
BASOPHILS NFR BLD AUTO: 1.1 % (ref 0–1.5)
BILIRUB SERPL-MCNC: 0.4 MG/DL (ref 0.1–1.2)
BUN SERPL-MCNC: 24 MG/DL (ref 8–23)
BUN/CREAT SERPL: 23.8 (ref 7–25)
CALCIUM SERPL-MCNC: 9.3 MG/DL (ref 8.2–9.6)
CHLORIDE SERPL-SCNC: 99 MMOL/L (ref 98–107)
CO2 SERPL-SCNC: 24.5 MMOL/L (ref 22–29)
CREAT SERPL-MCNC: 1.01 MG/DL (ref 0.57–1)
EOSINOPHIL # BLD AUTO: 0.1 10*3/MM3 (ref 0–0.7)
EOSINOPHIL NFR BLD AUTO: 1.1 % (ref 0.3–6.2)
ERYTHROCYTE [DISTWIDTH] IN BLOOD BY AUTOMATED COUNT: 15.7 % (ref 11.7–13)
GLOBULIN SER CALC-MCNC: 2.3 GM/DL
GLUCOSE SERPL-MCNC: 85 MG/DL (ref 65–99)
HCT VFR BLD AUTO: 37.3 % (ref 35.6–45.5)
HGB BLD-MCNC: 11.9 G/DL (ref 11.9–15.5)
IMM GRANULOCYTES # BLD: 0.02 10*3/MM3 (ref 0–0.03)
IMM GRANULOCYTES NFR BLD: 0.2 % (ref 0–0.5)
LYMPHOCYTES # BLD AUTO: 1.26 10*3/MM3 (ref 0.9–4.8)
LYMPHOCYTES NFR BLD AUTO: 13.7 % (ref 19.6–45.3)
MCH RBC QN AUTO: 31.5 PG (ref 26.9–32)
MCHC RBC AUTO-ENTMCNC: 31.9 G/DL (ref 32.4–36.3)
MCV RBC AUTO: 98.7 FL (ref 80.5–98.2)
MONOCYTES # BLD AUTO: 1.05 10*3/MM3 (ref 0.2–1.2)
MONOCYTES NFR BLD AUTO: 11.4 % (ref 5–12)
NEUTROPHILS # BLD AUTO: 6.69 10*3/MM3 (ref 1.9–8.1)
NEUTROPHILS NFR BLD AUTO: 72.7 % (ref 42.7–76)
PLATELET # BLD AUTO: 253 10*3/MM3 (ref 140–500)
POTASSIUM SERPL-SCNC: 4.3 MMOL/L (ref 3.5–5.2)
PROT SERPL-MCNC: 6.9 G/DL (ref 6–8.5)
RBC # BLD AUTO: 3.78 10*6/MM3 (ref 3.9–5.2)
SODIUM SERPL-SCNC: 141 MMOL/L (ref 136–145)
T4 FREE SERPL-MCNC: 1.55 NG/DL (ref 0.93–1.7)
TSH SERPL DL<=0.005 MIU/L-ACNC: 0.63 MIU/ML (ref 0.27–4.2)
WBC # BLD AUTO: 9.2 10*3/MM3 (ref 4.5–10.7)

## 2018-07-20 PROCEDURE — 99213 OFFICE O/P EST LOW 20 MIN: CPT | Performed by: FAMILY MEDICINE

## 2018-07-20 NOTE — PROGRESS NOTES
LISE Mayberry is a 89 y.o. female who is here for follow up of severe arthritis and generalized weakness and weight loss.  Significant weight loss noted but patient says she just does not have any appetite and is not interested in eating etc.      Review of Systems   Constitutional: Positive for appetite change and unexpected weight change.   HENT: Positive for hearing loss.    Genitourinary: Positive for urgency.   Allergic/Immunologic: Positive for environmental allergies.   All other systems reviewed and are negative.        Past Medical History:   Diagnosis Date   • Arthritis    • Disease of thyroid gland        Past Surgical History:   Procedure Laterality Date   • ADENOIDECTOMY     • APPENDECTOMY     • HYSTERECTOMY     • TONSILLECTOMY         No family history on file.    Social History     Social History   • Marital status: Single     Spouse name: N/A   • Number of children: N/A   • Years of education: N/A     Occupational History   • Not on file.     Social History Main Topics   • Smoking status: Never Smoker   • Smokeless tobacco: Never Used   • Alcohol use No   • Drug use: No   • Sexual activity: Defer     Other Topics Concern   • Not on file     Social History Narrative   • No narrative on file         Physical Exam   Constitutional: She is oriented to person, place, and time. She appears well-developed and well-nourished. No distress.   HENT:   Head: Normocephalic.   Nose: Nose normal.   Mouth/Throat: Oropharynx is clear and moist.   Eyes: Pupils are equal, round, and reactive to light. EOM are normal.   Neck: Normal range of motion. Neck supple.   Cardiovascular: Normal rate and regular rhythm.    Pulmonary/Chest: Effort normal. No respiratory distress.   Abdominal: Soft. She exhibits no distension and no mass. There is no tenderness.   Musculoskeletal: She exhibits deformity. She exhibits no edema.        Right shoulder: She exhibits decreased range of motion and deformity.        Left  shoulder: She exhibits decreased range of motion and deformity.        Right hip: She exhibits decreased range of motion and deformity.        Left hip: She exhibits decreased range of motion and deformity.   Neurological: She is alert and oriented to person, place, and time. Coordination normal.   Skin: Skin is warm and dry.   Psychiatric: She has a normal mood and affect. Her speech is normal and behavior is normal. Judgment and thought content normal. She is not slowed and not actively hallucinating. Cognition and memory are normal. She does not exhibit a depressed mood. She is attentive.   Nursing note and vitals reviewed.        Assessment/Plan    Bonnie was seen today for follow-up and hypothyroidism.    Diagnoses and all orders for this visit:    Acquired hypothyroidism  -     TSH+Free T4    High risk medication use  -     CBC & Differential  -     Comprehensive Metabolic Panel    Weight loss  -     Comprehensive Metabolic Panel    Generalized osteoarthritis      Patient is here for routine follow-up especially of severe generalized arthritis.  Frozen shoulders but did not return to orthopedist.  Obvious deformities some of which are noted above but patient is able to get out of chair on her own and ambulate etc.  Major concern with continued weight loss and patient admits to very poor appetite and eats very little.  Discussed with daughter getting boost or other supplements.  Will get routine lab and follow weight in 3 months.    This note includes information entered using a voice recognition dictation system.  Though reviewed, some nonsensible errors may remain.

## 2018-10-09 ENCOUNTER — EPISODE CHANGES (OUTPATIENT)
Dept: CASE MANAGEMENT | Facility: OTHER | Age: 83
End: 2018-10-09

## 2019-01-01 ENCOUNTER — PATIENT OUTREACH (OUTPATIENT)
Dept: CASE MANAGEMENT | Facility: OTHER | Age: 84
End: 2019-01-01

## 2019-01-01 ENCOUNTER — APPOINTMENT (OUTPATIENT)
Dept: GENERAL RADIOLOGY | Facility: HOSPITAL | Age: 84
End: 2019-01-01

## 2019-01-01 ENCOUNTER — RESULTS ENCOUNTER (OUTPATIENT)
Dept: FAMILY MEDICINE CLINIC | Facility: CLINIC | Age: 84
End: 2019-01-01

## 2019-01-01 ENCOUNTER — TELEPHONE (OUTPATIENT)
Dept: MAMMOGRAPHY | Facility: CLINIC | Age: 84
End: 2019-01-01

## 2019-01-01 ENCOUNTER — HOSPITAL ENCOUNTER (INPATIENT)
Facility: HOSPITAL | Age: 84
LOS: 14 days | Discharge: NURSING, REHAB OR LONG-TERM CARE | End: 2019-11-08
Attending: EMERGENCY MEDICINE | Admitting: SURGERY

## 2019-01-01 ENCOUNTER — OFFICE VISIT (OUTPATIENT)
Dept: MAMMOGRAPHY | Facility: CLINIC | Age: 84
End: 2019-01-01

## 2019-01-01 ENCOUNTER — ANESTHESIA EVENT (OUTPATIENT)
Dept: PERIOP | Facility: HOSPITAL | Age: 84
End: 2019-01-01

## 2019-01-01 ENCOUNTER — TELEPHONE (OUTPATIENT)
Dept: OTHER | Facility: HOSPITAL | Age: 84
End: 2019-01-01

## 2019-01-01 ENCOUNTER — TELEPHONE (OUTPATIENT)
Dept: FAMILY MEDICINE CLINIC | Facility: CLINIC | Age: 84
End: 2019-01-01

## 2019-01-01 ENCOUNTER — OFFICE VISIT (OUTPATIENT)
Dept: FAMILY MEDICINE CLINIC | Facility: CLINIC | Age: 84
End: 2019-01-01

## 2019-01-01 ENCOUNTER — APPOINTMENT (OUTPATIENT)
Dept: CT IMAGING | Facility: HOSPITAL | Age: 84
End: 2019-01-01

## 2019-01-01 ENCOUNTER — ANESTHESIA EVENT (OUTPATIENT)
Dept: GASTROENTEROLOGY | Facility: HOSPITAL | Age: 84
End: 2019-01-01

## 2019-01-01 ENCOUNTER — EPISODE CHANGES (OUTPATIENT)
Dept: CASE MANAGEMENT | Facility: OTHER | Age: 84
End: 2019-01-01

## 2019-01-01 ENCOUNTER — PREP FOR SURGERY (OUTPATIENT)
Dept: OTHER | Facility: HOSPITAL | Age: 84
End: 2019-01-01

## 2019-01-01 ENCOUNTER — ANESTHESIA (OUTPATIENT)
Dept: GASTROENTEROLOGY | Facility: HOSPITAL | Age: 84
End: 2019-01-01

## 2019-01-01 ENCOUNTER — ANESTHESIA (OUTPATIENT)
Dept: PERIOP | Facility: HOSPITAL | Age: 84
End: 2019-01-01

## 2019-01-01 VITALS
HEIGHT: 63 IN | SYSTOLIC BLOOD PRESSURE: 110 MMHG | BODY MASS INDEX: 17.61 KG/M2 | TEMPERATURE: 98.3 F | WEIGHT: 99.4 LBS | HEART RATE: 84 BPM | DIASTOLIC BLOOD PRESSURE: 62 MMHG | OXYGEN SATURATION: 99 % | RESPIRATION RATE: 16 BRPM

## 2019-01-01 VITALS
WEIGHT: 104 LBS | OXYGEN SATURATION: 98 % | RESPIRATION RATE: 15 BRPM | SYSTOLIC BLOOD PRESSURE: 128 MMHG | DIASTOLIC BLOOD PRESSURE: 60 MMHG | HEIGHT: 63 IN | TEMPERATURE: 98.1 F | HEART RATE: 58 BPM | BODY MASS INDEX: 18.43 KG/M2

## 2019-01-01 VITALS
HEART RATE: 71 BPM | WEIGHT: 103.84 LBS | RESPIRATION RATE: 18 BRPM | BODY MASS INDEX: 18.4 KG/M2 | SYSTOLIC BLOOD PRESSURE: 132 MMHG | OXYGEN SATURATION: 95 % | DIASTOLIC BLOOD PRESSURE: 70 MMHG | TEMPERATURE: 97.1 F | HEIGHT: 63 IN

## 2019-01-01 VITALS
WEIGHT: 95 LBS | DIASTOLIC BLOOD PRESSURE: 60 MMHG | HEART RATE: 91 BPM | OXYGEN SATURATION: 97 % | SYSTOLIC BLOOD PRESSURE: 110 MMHG | BODY MASS INDEX: 16.83 KG/M2 | TEMPERATURE: 99.2 F

## 2019-01-01 DIAGNOSIS — D64.9 ANEMIA, UNSPECIFIED TYPE: ICD-10-CM

## 2019-01-01 DIAGNOSIS — C50.511 MALIGNANT NEOPLASM OF LOWER-OUTER QUADRANT OF RIGHT FEMALE BREAST, UNSPECIFIED ESTROGEN RECEPTOR STATUS (HCC): ICD-10-CM

## 2019-01-01 DIAGNOSIS — F03.90 DEMENTIA WITHOUT BEHAVIORAL DISTURBANCE, UNSPECIFIED DEMENTIA TYPE: ICD-10-CM

## 2019-01-01 DIAGNOSIS — C50.111 MALIGNANT NEOPLASM OF CENTRAL PORTION OF RIGHT FEMALE BREAST, UNSPECIFIED ESTROGEN RECEPTOR STATUS (HCC): ICD-10-CM

## 2019-01-01 DIAGNOSIS — D64.9 ANEMIA, UNSPECIFIED TYPE: Primary | ICD-10-CM

## 2019-01-01 DIAGNOSIS — C50.111 MALIGNANT NEOPLASM OF CENTRAL PORTION OF RIGHT FEMALE BREAST, UNSPECIFIED ESTROGEN RECEPTOR STATUS (HCC): Primary | ICD-10-CM

## 2019-01-01 DIAGNOSIS — E03.9 ACQUIRED HYPOTHYROIDISM: Primary | ICD-10-CM

## 2019-01-01 DIAGNOSIS — E03.9 ACQUIRED HYPOTHYROIDISM: ICD-10-CM

## 2019-01-01 DIAGNOSIS — C50.911 MALIGNANT NEOPLASM OF RIGHT FEMALE BREAST, UNSPECIFIED ESTROGEN RECEPTOR STATUS, UNSPECIFIED SITE OF BREAST (HCC): ICD-10-CM

## 2019-01-01 DIAGNOSIS — R64 CACHECTIC (HCC): ICD-10-CM

## 2019-01-01 DIAGNOSIS — M15.9 GENERALIZED OSTEOARTHRITIS: ICD-10-CM

## 2019-01-01 DIAGNOSIS — Z79.899 HIGH RISK MEDICATION USE: ICD-10-CM

## 2019-01-01 DIAGNOSIS — N63.10 BREAST MASS, RIGHT: Primary | ICD-10-CM

## 2019-01-01 DIAGNOSIS — M15.9 GENERALIZED OSTEOARTHRITIS: Primary | ICD-10-CM

## 2019-01-01 LAB
ABO + RH BLD: NORMAL
ABO GROUP BLD: NORMAL
ALBUMIN SERPL-MCNC: 2.1 G/DL (ref 3.5–5.2)
ALBUMIN SERPL-MCNC: 2.3 G/DL (ref 3.5–5.2)
ALBUMIN SERPL-MCNC: 2.8 G/DL (ref 3.5–5.2)
ALBUMIN SERPL-MCNC: 3.6 G/DL (ref 3.5–5.2)
ALBUMIN SERPL-MCNC: 4 G/DL (ref 3.5–5.2)
ALBUMIN/GLOB SERPL: 0.8 G/DL
ALBUMIN/GLOB SERPL: 1 G/DL
ALBUMIN/GLOB SERPL: 1.3 G/DL
ALBUMIN/GLOB SERPL: 1.6 G/DL
ALP SERPL-CCNC: 54 U/L (ref 39–117)
ALP SERPL-CCNC: 57 U/L (ref 39–117)
ALP SERPL-CCNC: 61 U/L (ref 39–117)
ALP SERPL-CCNC: 85 U/L (ref 39–117)
ALP SERPL-CCNC: 89 U/L (ref 39–117)
ALT SERPL W P-5'-P-CCNC: 5 U/L (ref 1–33)
ALT SERPL W P-5'-P-CCNC: 6 U/L (ref 1–33)
ALT SERPL W P-5'-P-CCNC: <5 U/L (ref 1–33)
ALT SERPL-CCNC: 6 U/L (ref 1–33)
ALT SERPL-CCNC: 6 U/L (ref 1–33)
ANION GAP SERPL CALCULATED.3IONS-SCNC: 10.2 MMOL/L (ref 5–15)
ANION GAP SERPL CALCULATED.3IONS-SCNC: 3.4 MMOL/L (ref 5–15)
ANION GAP SERPL CALCULATED.3IONS-SCNC: 4.3 MMOL/L (ref 5–15)
ANION GAP SERPL CALCULATED.3IONS-SCNC: 6 MMOL/L (ref 5–15)
ANION GAP SERPL CALCULATED.3IONS-SCNC: 6.6 MMOL/L (ref 5–15)
ANION GAP SERPL CALCULATED.3IONS-SCNC: 6.9 MMOL/L (ref 5–15)
ANION GAP SERPL CALCULATED.3IONS-SCNC: 7 MMOL/L (ref 5–15)
ANION GAP SERPL CALCULATED.3IONS-SCNC: 7.4 MMOL/L (ref 5–15)
ANION GAP SERPL CALCULATED.3IONS-SCNC: 7.5 MMOL/L (ref 5–15)
ANION GAP SERPL CALCULATED.3IONS-SCNC: 7.8 MMOL/L (ref 5–15)
ANION GAP SERPL CALCULATED.3IONS-SCNC: 7.9 MMOL/L (ref 5–15)
ANION GAP SERPL CALCULATED.3IONS-SCNC: 8.6 MMOL/L (ref 5–15)
ANION GAP SERPL CALCULATED.3IONS-SCNC: 9 MMOL/L (ref 5–15)
ANION GAP SERPL CALCULATED.3IONS-SCNC: 9.7 MMOL/L (ref 5–15)
AST SERPL-CCNC: 10 U/L (ref 1–32)
AST SERPL-CCNC: 12 U/L (ref 1–32)
AST SERPL-CCNC: 12 U/L (ref 1–32)
AST SERPL-CCNC: 15 U/L (ref 1–32)
AST SERPL-CCNC: 7 U/L (ref 1–32)
BACTERIA UR QL AUTO: ABNORMAL /HPF
BASOPHILS # BLD AUTO: 0.03 10*3/MM3 (ref 0–0.2)
BASOPHILS # BLD AUTO: 0.05 10*3/MM3 (ref 0–0.2)
BASOPHILS # BLD AUTO: 0.06 10*3/MM3 (ref 0–0.2)
BASOPHILS # BLD AUTO: 0.07 10*3/MM3 (ref 0–0.2)
BASOPHILS # BLD AUTO: 0.07 10*3/MM3 (ref 0–0.2)
BASOPHILS # BLD AUTO: 0.11 10*3/MM3 (ref 0–0.2)
BASOPHILS # BLD AUTO: 0.12 10*3/MM3 (ref 0–0.2)
BASOPHILS # BLD AUTO: 0.13 10*3/MM3 (ref 0–0.2)
BASOPHILS NFR BLD AUTO: 0.2 % (ref 0–1.5)
BASOPHILS NFR BLD AUTO: 0.5 % (ref 0–1.5)
BASOPHILS NFR BLD AUTO: 0.6 % (ref 0–1.5)
BASOPHILS NFR BLD AUTO: 0.7 % (ref 0–1.5)
BASOPHILS NFR BLD AUTO: 0.7 % (ref 0–1.5)
BASOPHILS NFR BLD AUTO: 1.3 % (ref 0–1.5)
BASOPHILS NFR BLD AUTO: 1.3 % (ref 0–1.5)
BASOPHILS NFR BLD AUTO: 2.1 % (ref 0–1.5)
BH BB BLOOD EXPIRATION DATE: NORMAL
BH BB BLOOD TYPE BARCODE: 5100
BH BB DISPENSE STATUS: NORMAL
BH BB PRODUCT CODE: NORMAL
BH BB UNIT NUMBER: NORMAL
BILIRUB CONJ SERPL-MCNC: <0.2 MG/DL (ref 0.2–0.3)
BILIRUB INDIRECT SERPL-MCNC: ABNORMAL MG/DL
BILIRUB SERPL-MCNC: 0.2 MG/DL (ref 0.2–1.2)
BILIRUB SERPL-MCNC: 0.3 MG/DL (ref 0.1–1.2)
BILIRUB SERPL-MCNC: 0.4 MG/DL (ref 0.2–1.2)
BILIRUB UR QL STRIP: NEGATIVE
BLD GP AB SCN SERPL QL: NEGATIVE
BUN BLD-MCNC: 11 MG/DL (ref 8–23)
BUN BLD-MCNC: 13 MG/DL (ref 8–23)
BUN BLD-MCNC: 15 MG/DL (ref 8–23)
BUN BLD-MCNC: 16 MG/DL (ref 8–23)
BUN BLD-MCNC: 16 MG/DL (ref 8–23)
BUN BLD-MCNC: 38 MG/DL (ref 8–23)
BUN BLD-MCNC: 48 MG/DL (ref 8–23)
BUN BLD-MCNC: 7 MG/DL (ref 8–23)
BUN BLD-MCNC: 8 MG/DL (ref 8–23)
BUN SERPL-MCNC: 13 MG/DL (ref 8–23)
BUN SERPL-MCNC: 26 MG/DL (ref 8–23)
BUN/CREAT SERPL: 10.8 (ref 7–25)
BUN/CREAT SERPL: 10.9 (ref 7–25)
BUN/CREAT SERPL: 11.7 (ref 7–25)
BUN/CREAT SERPL: 12.3 (ref 7–25)
BUN/CREAT SERPL: 12.3 (ref 7–25)
BUN/CREAT SERPL: 12.7 (ref 7–25)
BUN/CREAT SERPL: 13.3 (ref 7–25)
BUN/CREAT SERPL: 16.9 (ref 7–25)
BUN/CREAT SERPL: 17.4 (ref 7–25)
BUN/CREAT SERPL: 18 (ref 7–25)
BUN/CREAT SERPL: 18.8 (ref 7–25)
BUN/CREAT SERPL: 19.7 (ref 7–25)
BUN/CREAT SERPL: 23.8 (ref 7–25)
BUN/CREAT SERPL: 25.2 (ref 7–25)
BUN/CREAT SERPL: 53.5 (ref 7–25)
BUN/CREAT SERPL: 57.1 (ref 7–25)
CALCIUM SERPL-MCNC: 8.9 MG/DL (ref 8.2–9.6)
CALCIUM SERPL-MCNC: 9.6 MG/DL (ref 8.2–9.6)
CALCIUM SPEC-SCNC: 7 MG/DL (ref 8.2–9.6)
CALCIUM SPEC-SCNC: 7 MG/DL (ref 8.2–9.6)
CALCIUM SPEC-SCNC: 7.1 MG/DL (ref 8.2–9.6)
CALCIUM SPEC-SCNC: 7.3 MG/DL (ref 8.2–9.6)
CALCIUM SPEC-SCNC: 7.6 MG/DL (ref 8.2–9.6)
CALCIUM SPEC-SCNC: 7.8 MG/DL (ref 8.2–9.6)
CALCIUM SPEC-SCNC: 7.9 MG/DL (ref 8.2–9.6)
CALCIUM SPEC-SCNC: 8.2 MG/DL (ref 8.2–9.6)
CALCIUM SPEC-SCNC: 8.3 MG/DL (ref 8.2–9.6)
CALCIUM SPEC-SCNC: 8.4 MG/DL (ref 8.2–9.6)
CHLORIDE SERPL-SCNC: 100 MMOL/L (ref 98–107)
CHLORIDE SERPL-SCNC: 100 MMOL/L (ref 98–107)
CHLORIDE SERPL-SCNC: 103 MMOL/L (ref 98–107)
CHLORIDE SERPL-SCNC: 104 MMOL/L (ref 98–107)
CHLORIDE SERPL-SCNC: 107 MMOL/L (ref 98–107)
CHLORIDE SERPL-SCNC: 91 MMOL/L (ref 98–107)
CHLORIDE SERPL-SCNC: 92 MMOL/L (ref 98–107)
CHLORIDE SERPL-SCNC: 93 MMOL/L (ref 98–107)
CHLORIDE SERPL-SCNC: 96 MMOL/L (ref 98–107)
CHLORIDE SERPL-SCNC: 96 MMOL/L (ref 98–107)
CHLORIDE SERPL-SCNC: 97 MMOL/L (ref 98–107)
CHLORIDE SERPL-SCNC: 98 MMOL/L (ref 98–107)
CHLORIDE SERPL-SCNC: 98 MMOL/L (ref 98–107)
CHLORIDE SERPL-SCNC: 99 MMOL/L (ref 98–107)
CLARITY UR: CLEAR
CO2 SERPL-SCNC: 22.1 MMOL/L (ref 22–29)
CO2 SERPL-SCNC: 22.4 MMOL/L (ref 22–29)
CO2 SERPL-SCNC: 23 MMOL/L (ref 22–29)
CO2 SERPL-SCNC: 24.8 MMOL/L (ref 22–29)
CO2 SERPL-SCNC: 24.8 MMOL/L (ref 22–29)
CO2 SERPL-SCNC: 25.2 MMOL/L (ref 22–29)
CO2 SERPL-SCNC: 26.3 MMOL/L (ref 22–29)
CO2 SERPL-SCNC: 28 MMOL/L (ref 22–29)
CO2 SERPL-SCNC: 28.1 MMOL/L (ref 22–29)
CO2 SERPL-SCNC: 28.4 MMOL/L (ref 22–29)
CO2 SERPL-SCNC: 28.5 MMOL/L (ref 22–29)
CO2 SERPL-SCNC: 30.2 MMOL/L (ref 22–29)
CO2 SERPL-SCNC: 30.6 MMOL/L (ref 22–29)
CO2 SERPL-SCNC: 33.7 MMOL/L (ref 22–29)
CO2 SERPL-SCNC: 35.6 MMOL/L (ref 22–29)
CO2 SERPL-SCNC: 36 MMOL/L (ref 22–29)
COLOR UR: YELLOW
CREAT BLD-MCNC: 0.57 MG/DL (ref 0.57–1)
CREAT BLD-MCNC: 0.6 MG/DL (ref 0.57–1)
CREAT BLD-MCNC: 0.6 MG/DL (ref 0.57–1)
CREAT BLD-MCNC: 0.61 MG/DL (ref 0.57–1)
CREAT BLD-MCNC: 0.63 MG/DL (ref 0.57–1)
CREAT BLD-MCNC: 0.63 MG/DL (ref 0.57–1)
CREAT BLD-MCNC: 0.64 MG/DL (ref 0.57–1)
CREAT BLD-MCNC: 0.65 MG/DL (ref 0.57–1)
CREAT BLD-MCNC: 0.65 MG/DL (ref 0.57–1)
CREAT BLD-MCNC: 0.71 MG/DL (ref 0.57–1)
CREAT BLD-MCNC: 0.77 MG/DL (ref 0.57–1)
CREAT BLD-MCNC: 0.84 MG/DL (ref 0.57–1)
CREAT BLD-MCNC: 0.85 MG/DL (ref 0.57–1)
CREAT BLD-MCNC: 0.92 MG/DL (ref 0.57–1)
CREAT SERPL-MCNC: 0.66 MG/DL (ref 0.57–1)
CREAT SERPL-MCNC: 1.03 MG/DL (ref 0.57–1)
CYTO UR: NORMAL
DEPRECATED RDW RBC AUTO: 42 FL (ref 37–54)
DEPRECATED RDW RBC AUTO: 43.2 FL (ref 37–54)
DEPRECATED RDW RBC AUTO: 43.7 FL (ref 37–54)
DEPRECATED RDW RBC AUTO: 43.8 FL (ref 37–54)
DEPRECATED RDW RBC AUTO: 44.3 FL (ref 37–54)
DEPRECATED RDW RBC AUTO: 44.6 FL (ref 37–54)
DEPRECATED RDW RBC AUTO: 44.7 FL (ref 37–54)
DEPRECATED RDW RBC AUTO: 46.2 FL (ref 37–54)
DEPRECATED RDW RBC AUTO: 46.3 FL (ref 37–54)
DEPRECATED RDW RBC AUTO: 46.3 FL (ref 37–54)
DEPRECATED RDW RBC AUTO: 46.4 FL (ref 37–54)
DX PRELIMINARY: NORMAL
EOSINOPHIL # BLD AUTO: 0.05 10*3/MM3 (ref 0–0.4)
EOSINOPHIL # BLD AUTO: 0.09 10*3/MM3 (ref 0–0.4)
EOSINOPHIL # BLD AUTO: 0.11 10*3/MM3 (ref 0–0.4)
EOSINOPHIL # BLD AUTO: 0.14 10*3/MM3 (ref 0–0.4)
EOSINOPHIL # BLD AUTO: 0.14 10*3/MM3 (ref 0–0.4)
EOSINOPHIL # BLD AUTO: 0.18 10*3/MM3 (ref 0–0.4)
EOSINOPHIL # BLD AUTO: 0.26 10*3/MM3 (ref 0–0.4)
EOSINOPHIL # BLD AUTO: 0.27 10*3/MM3 (ref 0–0.4)
EOSINOPHIL NFR BLD AUTO: 0.8 % (ref 0.3–6.2)
EOSINOPHIL NFR BLD AUTO: 1.1 % (ref 0.3–6.2)
EOSINOPHIL NFR BLD AUTO: 1.1 % (ref 0.3–6.2)
EOSINOPHIL NFR BLD AUTO: 1.2 % (ref 0.3–6.2)
EOSINOPHIL NFR BLD AUTO: 1.3 % (ref 0.3–6.2)
EOSINOPHIL NFR BLD AUTO: 1.9 % (ref 0.3–6.2)
EOSINOPHIL NFR BLD AUTO: 2.6 % (ref 0.3–6.2)
EOSINOPHIL NFR BLD AUTO: 2.7 % (ref 0.3–6.2)
ERYTHROCYTE [DISTWIDTH] IN BLOOD BY AUTOMATED COUNT: 13.8 % (ref 12.3–15.4)
ERYTHROCYTE [DISTWIDTH] IN BLOOD BY AUTOMATED COUNT: 14 % (ref 12.3–15.4)
ERYTHROCYTE [DISTWIDTH] IN BLOOD BY AUTOMATED COUNT: 14 % (ref 12.3–15.4)
ERYTHROCYTE [DISTWIDTH] IN BLOOD BY AUTOMATED COUNT: 14.4 % (ref 12.3–15.4)
ERYTHROCYTE [DISTWIDTH] IN BLOOD BY AUTOMATED COUNT: 14.5 % (ref 12.3–15.4)
ERYTHROCYTE [DISTWIDTH] IN BLOOD BY AUTOMATED COUNT: 14.6 % (ref 12.3–15.4)
ERYTHROCYTE [DISTWIDTH] IN BLOOD BY AUTOMATED COUNT: 14.7 % (ref 12.3–15.4)
ERYTHROCYTE [DISTWIDTH] IN BLOOD BY AUTOMATED COUNT: 14.7 % (ref 12.3–15.4)
ERYTHROCYTE [DISTWIDTH] IN BLOOD BY AUTOMATED COUNT: 16.8 % (ref 12.3–15.4)
ERYTHROCYTE [DISTWIDTH] IN BLOOD BY AUTOMATED COUNT: 17.2 % (ref 12.3–15.4)
FERRITIN SERPL-MCNC: 129 NG/ML (ref 13–150)
FERRITIN SERPL-MCNC: 163 NG/ML (ref 13–150)
FOLATE SERPL-MCNC: 7.42 NG/ML (ref 4.78–24.2)
GFR SERPL CREATININE-BSD FRML MDRD: 57 ML/MIN/1.73
GFR SERPL CREATININE-BSD FRML MDRD: 62 ML/MIN/1.73
GFR SERPL CREATININE-BSD FRML MDRD: 63 ML/MIN/1.73
GFR SERPL CREATININE-BSD FRML MDRD: 70 ML/MIN/1.73
GFR SERPL CREATININE-BSD FRML MDRD: 77 ML/MIN/1.73
GFR SERPL CREATININE-BSD FRML MDRD: 85 ML/MIN/1.73
GFR SERPL CREATININE-BSD FRML MDRD: 85 ML/MIN/1.73
GFR SERPL CREATININE-BSD FRML MDRD: 87 ML/MIN/1.73
GFR SERPL CREATININE-BSD FRML MDRD: 88 ML/MIN/1.73
GFR SERPL CREATININE-BSD FRML MDRD: 88 ML/MIN/1.73
GFR SERPL CREATININE-BSD FRML MDRD: 92 ML/MIN/1.73
GFR SERPL CREATININE-BSD FRML MDRD: 93 ML/MIN/1.73
GFR SERPL CREATININE-BSD FRML MDRD: 93 ML/MIN/1.73
GFR SERPL CREATININE-BSD FRML MDRD: 99 ML/MIN/1.73
GLOBULIN SER CALC-MCNC: 2.5 GM/DL
GLOBULIN SER CALC-MCNC: 2.7 GM/DL
GLOBULIN UR ELPH-MCNC: 2.6 GM/DL
GLOBULIN UR ELPH-MCNC: 2.8 GM/DL
GLUCOSE BLD-MCNC: 100 MG/DL (ref 65–99)
GLUCOSE BLD-MCNC: 106 MG/DL (ref 65–99)
GLUCOSE BLD-MCNC: 127 MG/DL (ref 65–99)
GLUCOSE BLD-MCNC: 70 MG/DL (ref 65–99)
GLUCOSE BLD-MCNC: 74 MG/DL (ref 65–99)
GLUCOSE BLD-MCNC: 82 MG/DL (ref 65–99)
GLUCOSE BLD-MCNC: 83 MG/DL (ref 65–99)
GLUCOSE BLD-MCNC: 83 MG/DL (ref 65–99)
GLUCOSE BLD-MCNC: 84 MG/DL (ref 65–99)
GLUCOSE BLD-MCNC: 84 MG/DL (ref 65–99)
GLUCOSE BLD-MCNC: 85 MG/DL (ref 65–99)
GLUCOSE BLD-MCNC: 86 MG/DL (ref 65–99)
GLUCOSE BLD-MCNC: 86 MG/DL (ref 65–99)
GLUCOSE BLD-MCNC: 89 MG/DL (ref 65–99)
GLUCOSE SERPL-MCNC: 123 MG/DL (ref 65–99)
GLUCOSE SERPL-MCNC: 91 MG/DL (ref 65–99)
GLUCOSE UR STRIP-MCNC: NEGATIVE MG/DL
HCT VFR BLD AUTO: 15.1 % (ref 34–46.6)
HCT VFR BLD AUTO: 20.9 % (ref 34–46.6)
HCT VFR BLD AUTO: 21.7 % (ref 34–46.6)
HCT VFR BLD AUTO: 22.8 % (ref 34–46.6)
HCT VFR BLD AUTO: 23.4 % (ref 34–46.6)
HCT VFR BLD AUTO: 24.5 % (ref 34–46.6)
HCT VFR BLD AUTO: 25.1 % (ref 34–46.6)
HCT VFR BLD AUTO: 25.2 % (ref 34–46.6)
HCT VFR BLD AUTO: 29.6 % (ref 34–46.6)
HCT VFR BLD AUTO: 30.2 % (ref 34–46.6)
HCT VFR BLD AUTO: 31.1 % (ref 34–46.6)
HCT VFR BLD AUTO: 31.2 % (ref 34–46.6)
HCT VFR BLD AUTO: 31.2 % (ref 34–46.6)
HCT VFR BLD AUTO: 31.7 % (ref 34–46.6)
HCT VFR BLD AUTO: 33 % (ref 34–46.6)
HCT VFR BLD AUTO: 33.7 % (ref 34–46.6)
HCT VFR BLD AUTO: 35.7 % (ref 34–46.6)
HEMOCCULT STL QL: NEGATIVE
HGB BLD-MCNC: 10 G/DL (ref 12–15.9)
HGB BLD-MCNC: 10.1 G/DL (ref 12–15.9)
HGB BLD-MCNC: 10.2 G/DL (ref 12–15.9)
HGB BLD-MCNC: 10.8 G/DL (ref 12–15.9)
HGB BLD-MCNC: 4.7 G/DL (ref 12–15.9)
HGB BLD-MCNC: 6.8 G/DL (ref 12–15.9)
HGB BLD-MCNC: 7.1 G/DL (ref 12–15.9)
HGB BLD-MCNC: 7.3 G/DL (ref 12–15.9)
HGB BLD-MCNC: 7.6 G/DL (ref 12–15.9)
HGB BLD-MCNC: 8 G/DL (ref 12–15.9)
HGB BLD-MCNC: 8.1 G/DL (ref 12–15.9)
HGB BLD-MCNC: 8.2 G/DL (ref 12–15.9)
HGB BLD-MCNC: 9.3 G/DL (ref 12–15.9)
HGB BLD-MCNC: 9.6 G/DL (ref 12–15.9)
HGB BLD-MCNC: 9.9 G/DL (ref 12–15.9)
HGB UR QL STRIP.AUTO: NEGATIVE
HYALINE CASTS UR QL AUTO: ABNORMAL /LPF
IMM GRANULOCYTES # BLD AUTO: 0.03 10*3/MM3 (ref 0–0.05)
IMM GRANULOCYTES # BLD AUTO: 0.06 10*3/MM3 (ref 0–0.05)
IMM GRANULOCYTES # BLD AUTO: 0.08 10*3/MM3 (ref 0–0.05)
IMM GRANULOCYTES # BLD AUTO: 0.11 10*3/MM3 (ref 0–0.05)
IMM GRANULOCYTES # BLD AUTO: 0.13 10*3/MM3 (ref 0–0.05)
IMM GRANULOCYTES # BLD AUTO: 0.14 10*3/MM3 (ref 0–0.05)
IMM GRANULOCYTES NFR BLD AUTO: 0.3 % (ref 0–0.5)
IMM GRANULOCYTES NFR BLD AUTO: 0.4 % (ref 0–0.5)
IMM GRANULOCYTES NFR BLD AUTO: 0.5 % (ref 0–0.5)
IMM GRANULOCYTES NFR BLD AUTO: 0.6 % (ref 0–0.5)
IMM GRANULOCYTES NFR BLD AUTO: 0.9 % (ref 0–0.5)
IMM GRANULOCYTES NFR BLD AUTO: 1 % (ref 0–0.5)
IMM GRANULOCYTES NFR BLD AUTO: 1.2 % (ref 0–0.5)
IMM GRANULOCYTES NFR BLD AUTO: 1.3 % (ref 0–0.5)
INR PPP: 1.14 (ref 0.9–1.1)
IRON 24H UR-MRATE: 45 MCG/DL (ref 37–145)
IRON SATN MFR SERPL: 15 % (ref 20–50)
KETONES UR QL STRIP: NEGATIVE
LAB AP CASE REPORT: NORMAL
LAB AP SPECIAL STAINS: NORMAL
LAB AP SYNOPTIC CHECKLIST: NORMAL
LEUKOCYTE ESTERASE UR QL STRIP.AUTO: ABNORMAL
LIPASE SERPL-CCNC: 25 U/L (ref 13–60)
LYMPHOCYTES # BLD AUTO: 0.86 10*3/MM3 (ref 0.7–3.1)
LYMPHOCYTES # BLD AUTO: 0.86 10*3/MM3 (ref 0.7–3.1)
LYMPHOCYTES # BLD AUTO: 0.9 10*3/MM3 (ref 0.7–3.1)
LYMPHOCYTES # BLD AUTO: 0.91 10*3/MM3 (ref 0.7–3.1)
LYMPHOCYTES # BLD AUTO: 0.99 10*3/MM3 (ref 0.7–3.1)
LYMPHOCYTES # BLD AUTO: 1 10*3/MM3 (ref 0.7–3.1)
LYMPHOCYTES # BLD AUTO: 1.07 10*3/MM3 (ref 0.7–3.1)
LYMPHOCYTES # BLD AUTO: 1.08 10*3/MM3 (ref 0.7–3.1)
LYMPHOCYTES NFR BLD AUTO: 11.8 % (ref 19.6–45.3)
LYMPHOCYTES NFR BLD AUTO: 11.8 % (ref 19.6–45.3)
LYMPHOCYTES NFR BLD AUTO: 14.7 % (ref 19.6–45.3)
LYMPHOCYTES NFR BLD AUTO: 7.9 % (ref 19.6–45.3)
LYMPHOCYTES NFR BLD AUTO: 8.4 % (ref 19.6–45.3)
LYMPHOCYTES NFR BLD AUTO: 9.2 % (ref 19.6–45.3)
LYMPHOCYTES NFR BLD AUTO: 9.4 % (ref 19.6–45.3)
LYMPHOCYTES NFR BLD AUTO: 9.6 % (ref 19.6–45.3)
MAGNESIUM SERPL-MCNC: 1.7 MG/DL (ref 1.7–2.3)
MAGNESIUM SERPL-MCNC: 1.8 MG/DL (ref 1.7–2.3)
MAGNESIUM SERPL-MCNC: 1.9 MG/DL (ref 1.7–2.3)
MAGNESIUM SERPL-MCNC: 2 MG/DL (ref 1.7–2.3)
MAGNESIUM SERPL-MCNC: 2.1 MG/DL (ref 1.7–2.3)
MCH RBC QN AUTO: 26.5 PG (ref 26.6–33)
MCH RBC QN AUTO: 26.6 PG (ref 26.6–33)
MCH RBC QN AUTO: 27 PG (ref 26.6–33)
MCH RBC QN AUTO: 27.3 PG (ref 26.6–33)
MCH RBC QN AUTO: 27.4 PG (ref 26.6–33)
MCH RBC QN AUTO: 27.4 PG (ref 26.6–33)
MCH RBC QN AUTO: 27.5 PG (ref 26.6–33)
MCH RBC QN AUTO: 27.5 PG (ref 26.6–33)
MCH RBC QN AUTO: 27.7 PG (ref 26.6–33)
MCH RBC QN AUTO: 27.7 PG (ref 26.6–33)
MCH RBC QN AUTO: 27.8 PG (ref 26.6–33)
MCH RBC QN AUTO: 27.9 PG (ref 26.6–33)
MCH RBC QN AUTO: 27.9 PG (ref 26.6–33)
MCH RBC QN AUTO: 28.1 PG (ref 26.6–33)
MCHC RBC AUTO-ENTMCNC: 29.8 G/DL (ref 31.5–35.7)
MCHC RBC AUTO-ENTMCNC: 30.3 G/DL (ref 31.5–35.7)
MCHC RBC AUTO-ENTMCNC: 30.3 G/DL (ref 31.5–35.7)
MCHC RBC AUTO-ENTMCNC: 30.9 G/DL (ref 31.5–35.7)
MCHC RBC AUTO-ENTMCNC: 31.1 G/DL (ref 31.5–35.7)
MCHC RBC AUTO-ENTMCNC: 31.5 G/DL (ref 31.5–35.7)
MCHC RBC AUTO-ENTMCNC: 31.8 G/DL (ref 31.5–35.7)
MCHC RBC AUTO-ENTMCNC: 31.9 G/DL (ref 31.5–35.7)
MCHC RBC AUTO-ENTMCNC: 32 G/DL (ref 31.5–35.7)
MCHC RBC AUTO-ENTMCNC: 32.3 G/DL (ref 31.5–35.7)
MCHC RBC AUTO-ENTMCNC: 32.5 G/DL (ref 31.5–35.7)
MCHC RBC AUTO-ENTMCNC: 32.5 G/DL (ref 31.5–35.7)
MCHC RBC AUTO-ENTMCNC: 32.7 G/DL (ref 31.5–35.7)
MCHC RBC AUTO-ENTMCNC: 33.4 G/DL (ref 31.5–35.7)
MCV RBC AUTO: 84.1 FL (ref 79–97)
MCV RBC AUTO: 84.3 FL (ref 79–97)
MCV RBC AUTO: 84.7 FL (ref 79–97)
MCV RBC AUTO: 85.2 FL (ref 79–97)
MCV RBC AUTO: 85.4 FL (ref 79–97)
MCV RBC AUTO: 86.6 FL (ref 79–97)
MCV RBC AUTO: 86.8 FL (ref 79–97)
MCV RBC AUTO: 86.8 FL (ref 79–97)
MCV RBC AUTO: 86.9 FL (ref 79–97)
MCV RBC AUTO: 87 FL (ref 79–97)
MCV RBC AUTO: 87.5 FL (ref 79–97)
MCV RBC AUTO: 89.1 FL (ref 79–97)
MCV RBC AUTO: 89.7 FL (ref 79–97)
MCV RBC AUTO: 90.8 FL (ref 79–97)
MONOCYTES # BLD AUTO: 0.58 10*3/MM3 (ref 0.1–0.9)
MONOCYTES # BLD AUTO: 0.84 10*3/MM3 (ref 0.1–0.9)
MONOCYTES # BLD AUTO: 0.93 10*3/MM3 (ref 0.1–0.9)
MONOCYTES # BLD AUTO: 0.98 10*3/MM3 (ref 0.1–0.9)
MONOCYTES # BLD AUTO: 1.01 10*3/MM3 (ref 0.1–0.9)
MONOCYTES # BLD AUTO: 1.1 10*3/MM3 (ref 0.1–0.9)
MONOCYTES # BLD AUTO: 1.24 10*3/MM3 (ref 0.1–0.9)
MONOCYTES # BLD AUTO: 1.48 10*3/MM3 (ref 0.1–0.9)
MONOCYTES NFR BLD AUTO: 10.5 % (ref 5–12)
MONOCYTES NFR BLD AUTO: 10.5 % (ref 5–12)
MONOCYTES NFR BLD AUTO: 11 % (ref 5–12)
MONOCYTES NFR BLD AUTO: 11.1 % (ref 5–12)
MONOCYTES NFR BLD AUTO: 11.4 % (ref 5–12)
MONOCYTES NFR BLD AUTO: 11.6 % (ref 5–12)
MONOCYTES NFR BLD AUTO: 8.9 % (ref 5–12)
MONOCYTES NFR BLD AUTO: 9.5 % (ref 5–12)
NEUTROPHILS # BLD AUTO: 4.43 10*3/MM3 (ref 1.7–7)
NEUTROPHILS # BLD AUTO: 6.3 10*3/MM3 (ref 1.4–7)
NEUTROPHILS # BLD AUTO: 6.75 10*3/MM3 (ref 1.4–7)
NEUTROPHILS # BLD AUTO: 7.17 10*3/MM3 (ref 1.7–7)
NEUTROPHILS # BLD AUTO: 7.29 10*3/MM3 (ref 1.7–7)
NEUTROPHILS # BLD AUTO: 7.91 10*3/MM3 (ref 1.7–7)
NEUTROPHILS # BLD AUTO: 8.54 10*3/MM3 (ref 1.7–7)
NEUTROPHILS # BLD AUTO: 9.94 10*3/MM3 (ref 1.7–7)
NEUTROPHILS NFR BLD AUTO: 72.4 % (ref 42.7–76)
NEUTROPHILS NFR BLD AUTO: 74.3 % (ref 42.7–76)
NEUTROPHILS NFR BLD AUTO: 74.4 % (ref 42.7–76)
NEUTROPHILS NFR BLD AUTO: 75.5 % (ref 42.7–76)
NEUTROPHILS NFR BLD AUTO: 76.8 % (ref 42.7–76)
NEUTROPHILS NFR BLD AUTO: 77.1 % (ref 42.7–76)
NEUTROPHILS NFR BLD AUTO: 77.7 % (ref 42.7–76)
NEUTROPHILS NFR BLD AUTO: 78.2 % (ref 42.7–76)
NITRITE UR QL STRIP: NEGATIVE
NRBC BLD AUTO-RTO: 0 /100 WBC (ref 0–0)
NRBC BLD AUTO-RTO: 0 /100 WBC (ref 0–0)
NRBC BLD AUTO-RTO: 0 /100 WBC (ref 0–0.2)
NRBC BLD AUTO-RTO: 0.1 /100 WBC (ref 0–0.2)
PATH REPORT.FINAL DX SPEC: NORMAL
PATH REPORT.GROSS SPEC: NORMAL
PH UR STRIP.AUTO: 5.5 [PH] (ref 5–8)
PHOSPHATE SERPL-MCNC: 1.6 MG/DL (ref 2.5–4.5)
PHOSPHATE SERPL-MCNC: 2.7 MG/DL (ref 2.5–4.5)
PHOSPHATE SERPL-MCNC: 3.3 MG/DL (ref 2.5–4.5)
PHOSPHATE SERPL-MCNC: 4.7 MG/DL (ref 2.5–4.5)
PLATELET # BLD AUTO: 207 10*3/MM3 (ref 140–450)
PLATELET # BLD AUTO: 224 10*3/MM3 (ref 140–450)
PLATELET # BLD AUTO: 231 10*3/MM3 (ref 140–450)
PLATELET # BLD AUTO: 255 10*3/MM3 (ref 140–450)
PLATELET # BLD AUTO: 275 10*3/MM3 (ref 140–450)
PLATELET # BLD AUTO: 283 10*3/MM3 (ref 140–450)
PLATELET # BLD AUTO: 291 10*3/MM3 (ref 140–450)
PLATELET # BLD AUTO: 298 10*3/MM3 (ref 140–450)
PLATELET # BLD AUTO: 312 10*3/MM3 (ref 140–450)
PLATELET # BLD AUTO: 326 10*3/MM3 (ref 140–450)
PLATELET # BLD AUTO: 332 10*3/MM3 (ref 140–450)
PLATELET # BLD AUTO: 343 10*3/MM3 (ref 140–450)
PLATELET # BLD AUTO: 348 10*3/MM3 (ref 140–450)
PLATELET # BLD AUTO: 358 10*3/MM3 (ref 140–450)
PMV BLD AUTO: 10 FL (ref 6–12)
PMV BLD AUTO: 10.1 FL (ref 6–12)
PMV BLD AUTO: 10.2 FL (ref 6–12)
PMV BLD AUTO: 10.4 FL (ref 6–12)
PMV BLD AUTO: 9.5 FL (ref 6–12)
PMV BLD AUTO: 9.6 FL (ref 6–12)
PMV BLD AUTO: 9.6 FL (ref 6–12)
PMV BLD AUTO: 9.7 FL (ref 6–12)
PMV BLD AUTO: 9.8 FL (ref 6–12)
PMV BLD AUTO: 9.8 FL (ref 6–12)
PMV BLD AUTO: 9.9 FL (ref 6–12)
POTASSIUM BLD-SCNC: 2.7 MMOL/L (ref 3.5–5.2)
POTASSIUM BLD-SCNC: 2.9 MMOL/L (ref 3.5–5.2)
POTASSIUM BLD-SCNC: 3.1 MMOL/L (ref 3.5–5.2)
POTASSIUM BLD-SCNC: 3.1 MMOL/L (ref 3.5–5.2)
POTASSIUM BLD-SCNC: 3.2 MMOL/L (ref 3.5–5.2)
POTASSIUM BLD-SCNC: 3.2 MMOL/L (ref 3.5–5.2)
POTASSIUM BLD-SCNC: 3.5 MMOL/L (ref 3.5–5.2)
POTASSIUM BLD-SCNC: 3.6 MMOL/L (ref 3.5–5.2)
POTASSIUM BLD-SCNC: 4 MMOL/L (ref 3.5–5.2)
POTASSIUM BLD-SCNC: 4.1 MMOL/L (ref 3.5–5.2)
POTASSIUM BLD-SCNC: 4.1 MMOL/L (ref 3.5–5.2)
POTASSIUM BLD-SCNC: 4.5 MMOL/L (ref 3.5–5.2)
POTASSIUM BLD-SCNC: 4.6 MMOL/L (ref 3.5–5.2)
POTASSIUM BLD-SCNC: 4.6 MMOL/L (ref 3.5–5.2)
POTASSIUM BLD-SCNC: 4.8 MMOL/L (ref 3.5–5.2)
POTASSIUM BLD-SCNC: 4.9 MMOL/L (ref 3.5–5.2)
POTASSIUM SERPL-SCNC: 3.7 MMOL/L (ref 3.5–5.2)
POTASSIUM SERPL-SCNC: 4.2 MMOL/L (ref 3.5–5.2)
PROT SERPL-MCNC: 4.7 G/DL (ref 6–8.5)
PROT SERPL-MCNC: 5 G/DL (ref 6–8.5)
PROT SERPL-MCNC: 5.6 G/DL (ref 6–8.5)
PROT SERPL-MCNC: 6.3 G/DL (ref 6–8.5)
PROT SERPL-MCNC: 6.5 G/DL (ref 6–8.5)
PROT UR QL STRIP: NEGATIVE
PROTHROMBIN TIME: 14.3 SECONDS (ref 11.7–14.2)
RBC # BLD AUTO: 1.74 10*6/MM3 (ref 3.77–5.28)
RBC # BLD AUTO: 2.45 10*6/MM3 (ref 3.77–5.28)
RBC # BLD AUTO: 2.67 10*6/MM3 (ref 3.77–5.28)
RBC # BLD AUTO: 2.9 10*6/MM3 (ref 3.77–5.28)
RBC # BLD AUTO: 2.99 10*6/MM3 (ref 3.77–5.28)
RBC # BLD AUTO: 3.47 10*6/MM3 (ref 3.77–5.28)
RBC # BLD AUTO: 3.5 10*6/MM3 (ref 3.77–5.28)
RBC # BLD AUTO: 3.52 10*6/MM3 (ref 3.77–5.28)
RBC # BLD AUTO: 3.65 10*6/MM3 (ref 3.77–5.28)
RBC # BLD AUTO: 3.66 10*6/MM3 (ref 3.77–5.28)
RBC # BLD AUTO: 3.67 10*6/MM3 (ref 3.77–5.28)
RBC # BLD AUTO: 3.68 10*6/MM3 (ref 3.77–5.28)
RBC # BLD AUTO: 3.85 10*6/MM3 (ref 3.77–5.28)
RBC # BLD AUTO: 3.93 10*6/MM3 (ref 3.77–5.28)
RBC # UR: ABNORMAL /HPF
REF LAB TEST METHOD: ABNORMAL
RETICS # AUTO: 0.03 10*6/MM3 (ref 0.02–0.13)
RETICS/RBC NFR AUTO: 0.98 % (ref 0.7–1.9)
RETICS/RBC NFR AUTO: 1.15 % (ref 0.5–1.5)
RH BLD: POSITIVE
SODIUM BLD-SCNC: 129 MMOL/L (ref 136–145)
SODIUM BLD-SCNC: 130 MMOL/L (ref 136–145)
SODIUM BLD-SCNC: 131 MMOL/L (ref 136–145)
SODIUM BLD-SCNC: 132 MMOL/L (ref 136–145)
SODIUM BLD-SCNC: 133 MMOL/L (ref 136–145)
SODIUM BLD-SCNC: 136 MMOL/L (ref 136–145)
SODIUM BLD-SCNC: 136 MMOL/L (ref 136–145)
SODIUM BLD-SCNC: 138 MMOL/L (ref 136–145)
SODIUM BLD-SCNC: 138 MMOL/L (ref 136–145)
SODIUM BLD-SCNC: 139 MMOL/L (ref 136–145)
SODIUM BLD-SCNC: 142 MMOL/L (ref 136–145)
SODIUM SERPL-SCNC: 132 MMOL/L (ref 136–145)
SODIUM SERPL-SCNC: 135 MMOL/L (ref 136–145)
SP GR UR STRIP: 1.02 (ref 1–1.03)
SQUAMOUS #/AREA URNS HPF: ABNORMAL /HPF
T&S EXPIRATION DATE: NORMAL
T4 FREE SERPL-MCNC: 0.13 NG/DL (ref 0.93–1.7)
T4 FREE SERPL-MCNC: 0.23 NG/DL (ref 0.93–1.7)
T4 FREE SERPL-MCNC: 1.2 NG/DL (ref 0.93–1.7)
TIBC SERPL-MCNC: 295 MCG/DL (ref 298–536)
TRANSFERRIN SERPL-MCNC: 198 MG/DL (ref 200–360)
TROPONIN T SERPL-MCNC: 0.01 NG/ML (ref 0–0.03)
TSH SERPL DL<=0.005 MIU/L-ACNC: 1.12 MIU/ML (ref 0.27–4.2)
TSH SERPL DL<=0.005 MIU/L-ACNC: 53.6 UIU/ML (ref 0.27–4.2)
TSH SERPL DL<=0.05 MIU/L-ACNC: 48.1 UIU/ML (ref 0.27–4.2)
UNIT  ABO: NORMAL
UNIT  RH: NORMAL
UREASE TISS QL: POSITIVE
UROBILINOGEN UR QL STRIP: ABNORMAL
VIT B12 BLD-MCNC: 510 PG/ML (ref 211–946)
VIT B12 SERPL-MCNC: 351 PG/ML (ref 211–946)
WBC # BLD AUTO: 10.9 10*3/MM3 (ref 3.4–10.8)
WBC # BLD AUTO: 8.46 10*3/MM3 (ref 3.4–10.8)
WBC # BLD AUTO: 9.09 10*3/MM3 (ref 3.4–10.8)
WBC NRBC COR # BLD: 10.48 10*3/MM3 (ref 3.4–10.8)
WBC NRBC COR # BLD: 12.41 10*3/MM3 (ref 3.4–10.8)
WBC NRBC COR # BLD: 12.71 10*3/MM3 (ref 3.4–10.8)
WBC NRBC COR # BLD: 12.8 10*3/MM3 (ref 3.4–10.8)
WBC NRBC COR # BLD: 14.44 10*3/MM3 (ref 3.4–10.8)
WBC NRBC COR # BLD: 17.56 10*3/MM3 (ref 3.4–10.8)
WBC NRBC COR # BLD: 5.48 10*3/MM3 (ref 3.4–10.8)
WBC NRBC COR # BLD: 6.01 10*3/MM3 (ref 3.4–10.8)
WBC NRBC COR # BLD: 6.12 10*3/MM3 (ref 3.4–10.8)
WBC NRBC COR # BLD: 9.34 10*3/MM3 (ref 3.4–10.8)
WBC NRBC COR # BLD: 9.46 10*3/MM3 (ref 3.4–10.8)
WBC UR QL AUTO: ABNORMAL /HPF

## 2019-01-01 PROCEDURE — 85027 COMPLETE CBC AUTOMATED: CPT | Performed by: INTERNAL MEDICINE

## 2019-01-01 PROCEDURE — 97165 OT EVAL LOW COMPLEX 30 MIN: CPT

## 2019-01-01 PROCEDURE — 85018 HEMOGLOBIN: CPT | Performed by: NURSE PRACTITIONER

## 2019-01-01 PROCEDURE — 87081 CULTURE SCREEN ONLY: CPT | Performed by: INTERNAL MEDICINE

## 2019-01-01 PROCEDURE — 99024 POSTOP FOLLOW-UP VISIT: CPT | Performed by: SURGERY

## 2019-01-01 PROCEDURE — 83735 ASSAY OF MAGNESIUM: CPT | Performed by: INTERNAL MEDICINE

## 2019-01-01 PROCEDURE — 84100 ASSAY OF PHOSPHORUS: CPT | Performed by: INTERNAL MEDICINE

## 2019-01-01 PROCEDURE — 80048 BASIC METABOLIC PNL TOTAL CA: CPT | Performed by: INTERNAL MEDICINE

## 2019-01-01 PROCEDURE — 86850 RBC ANTIBODY SCREEN: CPT | Performed by: EMERGENCY MEDICINE

## 2019-01-01 PROCEDURE — 25010000002 IOPAMIDOL 61 % SOLUTION: Performed by: INTERNAL MEDICINE

## 2019-01-01 PROCEDURE — 80053 COMPREHEN METABOLIC PANEL: CPT | Performed by: EMERGENCY MEDICINE

## 2019-01-01 PROCEDURE — 84439 ASSAY OF FREE THYROXINE: CPT | Performed by: INTERNAL MEDICINE

## 2019-01-01 PROCEDURE — 0 DIATRIZOATE MEGLUMINE & SODIUM PER 1 ML: Performed by: INTERNAL MEDICINE

## 2019-01-01 PROCEDURE — 25810000003 SODIUM CHLORIDE 0.9 % WITH KCL 20 MEQ 20-0.9 MEQ/L-% SOLUTION: Performed by: INTERNAL MEDICINE

## 2019-01-01 PROCEDURE — 86923 COMPATIBILITY TEST ELECTRIC: CPT

## 2019-01-01 PROCEDURE — 88377 M/PHMTRC ALYS ISHQUANT/SEMIQ: CPT

## 2019-01-01 PROCEDURE — 88342 IMHCHEM/IMCYTCHM 1ST ANTB: CPT | Performed by: SURGERY

## 2019-01-01 PROCEDURE — 80076 HEPATIC FUNCTION PANEL: CPT | Performed by: INTERNAL MEDICINE

## 2019-01-01 PROCEDURE — 88360 TUMOR IMMUNOHISTOCHEM/MANUAL: CPT | Performed by: SURGERY

## 2019-01-01 PROCEDURE — 99232 SBSQ HOSP IP/OBS MODERATE 35: CPT | Performed by: SURGERY

## 2019-01-01 PROCEDURE — 86900 BLOOD TYPING SEROLOGIC ABO: CPT

## 2019-01-01 PROCEDURE — 83690 ASSAY OF LIPASE: CPT | Performed by: INTERNAL MEDICINE

## 2019-01-01 PROCEDURE — 97162 PT EVAL MOD COMPLEX 30 MIN: CPT

## 2019-01-01 PROCEDURE — 97530 THERAPEUTIC ACTIVITIES: CPT

## 2019-01-01 PROCEDURE — 86901 BLOOD TYPING SEROLOGIC RH(D): CPT | Performed by: EMERGENCY MEDICINE

## 2019-01-01 PROCEDURE — 93010 ELECTROCARDIOGRAM REPORT: CPT | Performed by: INTERNAL MEDICINE

## 2019-01-01 PROCEDURE — 36430 TRANSFUSION BLD/BLD COMPNT: CPT

## 2019-01-01 PROCEDURE — 97110 THERAPEUTIC EXERCISES: CPT

## 2019-01-01 PROCEDURE — 25010000002 NEOSTIGMINE PER 0.5 MG: Performed by: ANESTHESIOLOGY

## 2019-01-01 PROCEDURE — 81001 URINALYSIS AUTO W/SCOPE: CPT | Performed by: EMERGENCY MEDICINE

## 2019-01-01 PROCEDURE — 92610 EVALUATE SWALLOWING FUNCTION: CPT

## 2019-01-01 PROCEDURE — 83540 ASSAY OF IRON: CPT | Performed by: NURSE PRACTITIONER

## 2019-01-01 PROCEDURE — 25010000002 PROPOFOL 10 MG/ML EMULSION: Performed by: ANESTHESIOLOGY

## 2019-01-01 PROCEDURE — 85025 COMPLETE CBC W/AUTO DIFF WBC: CPT | Performed by: INTERNAL MEDICINE

## 2019-01-01 PROCEDURE — 88307 TISSUE EXAM BY PATHOLOGIST: CPT | Performed by: SURGERY

## 2019-01-01 PROCEDURE — 86900 BLOOD TYPING SEROLOGIC ABO: CPT | Performed by: EMERGENCY MEDICINE

## 2019-01-01 PROCEDURE — 84484 ASSAY OF TROPONIN QUANT: CPT | Performed by: EMERGENCY MEDICINE

## 2019-01-01 PROCEDURE — 94799 UNLISTED PULMONARY SVC/PX: CPT

## 2019-01-01 PROCEDURE — 0HB5XZZ EXCISION OF CHEST SKIN, EXTERNAL APPROACH: ICD-10-PCS | Performed by: SURGERY

## 2019-01-01 PROCEDURE — 82272 OCCULT BLD FECES 1-3 TESTS: CPT | Performed by: NURSE PRACTITIONER

## 2019-01-01 PROCEDURE — 25010000002 MORPHINE PER 10 MG: Performed by: SURGERY

## 2019-01-01 PROCEDURE — P9016 RBC LEUKOCYTES REDUCED: HCPCS

## 2019-01-01 PROCEDURE — 99232 SBSQ HOSP IP/OBS MODERATE 35: CPT | Performed by: INTERNAL MEDICINE

## 2019-01-01 PROCEDURE — 74177 CT ABD & PELVIS W/CONTRAST: CPT

## 2019-01-01 PROCEDURE — 0DB68ZX EXCISION OF STOMACH, VIA NATURAL OR ARTIFICIAL OPENING ENDOSCOPIC, DIAGNOSTIC: ICD-10-PCS | Performed by: INTERNAL MEDICINE

## 2019-01-01 PROCEDURE — 99213 OFFICE O/P EST LOW 20 MIN: CPT | Performed by: FAMILY MEDICINE

## 2019-01-01 PROCEDURE — 85014 HEMATOCRIT: CPT | Performed by: NURSE PRACTITIONER

## 2019-01-01 PROCEDURE — 0HX5XZZ TRANSFER CHEST SKIN, EXTERNAL APPROACH: ICD-10-PCS | Performed by: SURGERY

## 2019-01-01 PROCEDURE — 97535 SELF CARE MNGMENT TRAINING: CPT

## 2019-01-01 PROCEDURE — 93005 ELECTROCARDIOGRAM TRACING: CPT | Performed by: EMERGENCY MEDICINE

## 2019-01-01 PROCEDURE — 99221 1ST HOSP IP/OBS SF/LOW 40: CPT | Performed by: SURGERY

## 2019-01-01 PROCEDURE — 88309 TISSUE EXAM BY PATHOLOGIST: CPT | Performed by: SURGERY

## 2019-01-01 PROCEDURE — 84443 ASSAY THYROID STIM HORMONE: CPT | Performed by: INTERNAL MEDICINE

## 2019-01-01 PROCEDURE — 85610 PROTHROMBIN TIME: CPT | Performed by: INTERNAL MEDICINE

## 2019-01-01 PROCEDURE — 85045 AUTOMATED RETICULOCYTE COUNT: CPT | Performed by: NURSE PRACTITIONER

## 2019-01-01 PROCEDURE — 99222 1ST HOSP IP/OBS MODERATE 55: CPT | Performed by: INTERNAL MEDICINE

## 2019-01-01 PROCEDURE — 25010000002 MORPHINE SULFATE (PF) 2 MG/ML SOLUTION: Performed by: ANESTHESIOLOGY

## 2019-01-01 PROCEDURE — 86901 BLOOD TYPING SEROLOGIC RH(D): CPT

## 2019-01-01 PROCEDURE — 80048 BASIC METABOLIC PNL TOTAL CA: CPT | Performed by: NURSE PRACTITIONER

## 2019-01-01 PROCEDURE — 82746 ASSAY OF FOLIC ACID SERUM: CPT | Performed by: NURSE PRACTITIONER

## 2019-01-01 PROCEDURE — 99233 SBSQ HOSP IP/OBS HIGH 50: CPT | Performed by: INTERNAL MEDICINE

## 2019-01-01 PROCEDURE — 84466 ASSAY OF TRANSFERRIN: CPT | Performed by: NURSE PRACTITIONER

## 2019-01-01 PROCEDURE — 84132 ASSAY OF SERUM POTASSIUM: CPT | Performed by: ANESTHESIOLOGY

## 2019-01-01 PROCEDURE — 43239 EGD BIOPSY SINGLE/MULTIPLE: CPT | Performed by: INTERNAL MEDICINE

## 2019-01-01 PROCEDURE — 36415 COLL VENOUS BLD VENIPUNCTURE: CPT | Performed by: NURSE PRACTITIONER

## 2019-01-01 PROCEDURE — 82607 VITAMIN B-12: CPT | Performed by: NURSE PRACTITIONER

## 2019-01-01 PROCEDURE — 85025 COMPLETE CBC W/AUTO DIFF WBC: CPT | Performed by: NURSE PRACTITIONER

## 2019-01-01 PROCEDURE — 82728 ASSAY OF FERRITIN: CPT | Performed by: NURSE PRACTITIONER

## 2019-01-01 PROCEDURE — 71046 X-RAY EXAM CHEST 2 VIEWS: CPT

## 2019-01-01 PROCEDURE — 25010000002 FENTANYL CITRATE (PF) 100 MCG/2ML SOLUTION: Performed by: ANESTHESIOLOGY

## 2019-01-01 PROCEDURE — 88305 TISSUE EXAM BY PATHOLOGIST: CPT | Performed by: SURGERY

## 2019-01-01 PROCEDURE — 19303 MAST SIMPLE COMPLETE: CPT | Performed by: SURGERY

## 2019-01-01 PROCEDURE — 99214 OFFICE O/P EST MOD 30 MIN: CPT | Performed by: FAMILY MEDICINE

## 2019-01-01 PROCEDURE — 25010000003 POTASSIUM CHLORIDE 10 MEQ/100ML SOLUTION: Performed by: INTERNAL MEDICINE

## 2019-01-01 PROCEDURE — 99204 OFFICE O/P NEW MOD 45 MIN: CPT | Performed by: SURGERY

## 2019-01-01 PROCEDURE — 25010000002 ONDANSETRON PER 1 MG: Performed by: ANESTHESIOLOGY

## 2019-01-01 PROCEDURE — 85025 COMPLETE CBC W/AUTO DIFF WBC: CPT | Performed by: EMERGENCY MEDICINE

## 2019-01-01 PROCEDURE — 99284 EMERGENCY DEPT VISIT MOD MDM: CPT

## 2019-01-01 PROCEDURE — 85027 COMPLETE CBC AUTOMATED: CPT | Performed by: NURSE PRACTITIONER

## 2019-01-01 PROCEDURE — 80053 COMPREHEN METABOLIC PANEL: CPT | Performed by: INTERNAL MEDICINE

## 2019-01-01 PROCEDURE — 93005 ELECTROCARDIOGRAM TRACING: CPT | Performed by: SURGERY

## 2019-01-01 PROCEDURE — 97116 GAIT TRAINING THERAPY: CPT

## 2019-01-01 PROCEDURE — 0HTT0ZZ RESECTION OF RIGHT BREAST, OPEN APPROACH: ICD-10-PCS | Performed by: SURGERY

## 2019-01-01 PROCEDURE — 88341 IMHCHEM/IMCYTCHM EA ADD ANTB: CPT | Performed by: SURGERY

## 2019-01-01 PROCEDURE — 97535 SELF CARE MNGMENT TRAINING: CPT | Performed by: OCCUPATIONAL THERAPIST

## 2019-01-01 PROCEDURE — 84132 ASSAY OF SERUM POTASSIUM: CPT | Performed by: INTERNAL MEDICINE

## 2019-01-01 RX ORDER — PANTOPRAZOLE SODIUM 40 MG/1
40 TABLET, DELAYED RELEASE ORAL
Start: 2019-01-01

## 2019-01-01 RX ORDER — LEVOTHYROXINE SODIUM 0.1 MG/1
TABLET ORAL
Qty: 30 TABLET | Refills: 5 | OUTPATIENT
Start: 2019-01-01 | End: 2019-01-01 | Stop reason: HOSPADM

## 2019-01-01 RX ORDER — FENTANYL CITRATE 50 UG/ML
50 INJECTION, SOLUTION INTRAMUSCULAR; INTRAVENOUS
Status: DISCONTINUED | OUTPATIENT
Start: 2019-01-01 | End: 2019-01-01

## 2019-01-01 RX ORDER — ONDANSETRON 2 MG/ML
4 INJECTION INTRAMUSCULAR; INTRAVENOUS EVERY 6 HOURS PRN
Status: DISCONTINUED | OUTPATIENT
Start: 2019-01-01 | End: 2019-01-01 | Stop reason: HOSPADM

## 2019-01-01 RX ORDER — LEVOTHYROXINE SODIUM 137 UG/1
137 TABLET ORAL
Status: DISCONTINUED | OUTPATIENT
Start: 2019-01-01 | End: 2019-01-01 | Stop reason: HOSPADM

## 2019-01-01 RX ORDER — LEVOTHYROXINE SODIUM 0.1 MG/1
100 TABLET ORAL DAILY
Qty: 30 TABLET | Refills: 5 | Status: ON HOLD | OUTPATIENT
Start: 2019-01-01 | End: 2019-01-01 | Stop reason: SDUPTHER

## 2019-01-01 RX ORDER — POLYETHYLENE GLYCOL 3350 17 G/17G
17 POWDER, FOR SOLUTION ORAL 2 TIMES DAILY
Start: 2019-01-01

## 2019-01-01 RX ORDER — POLYETHYLENE GLYCOL 3350 17 G/17G
17 POWDER, FOR SOLUTION ORAL 2 TIMES DAILY
Status: DISCONTINUED | OUTPATIENT
Start: 2019-01-01 | End: 2019-01-01

## 2019-01-01 RX ORDER — HYDROMORPHONE HYDROCHLORIDE 1 MG/ML
0.5 INJECTION, SOLUTION INTRAMUSCULAR; INTRAVENOUS; SUBCUTANEOUS
Status: DISCONTINUED | OUTPATIENT
Start: 2019-01-01 | End: 2019-01-01

## 2019-01-01 RX ORDER — CETIRIZINE HYDROCHLORIDE 10 MG/1
5 TABLET ORAL DAILY
Status: DISCONTINUED | OUTPATIENT
Start: 2019-01-01 | End: 2019-01-01 | Stop reason: HOSPADM

## 2019-01-01 RX ORDER — PROMETHAZINE HYDROCHLORIDE 25 MG/1
25 TABLET ORAL ONCE AS NEEDED
Status: DISCONTINUED | OUTPATIENT
Start: 2019-01-01 | End: 2019-01-01 | Stop reason: HOSPADM

## 2019-01-01 RX ORDER — POTASSIUM CHLORIDE 750 MG/1
20 CAPSULE, EXTENDED RELEASE ORAL 2 TIMES DAILY WITH MEALS
Status: DISCONTINUED | OUTPATIENT
Start: 2019-01-01 | End: 2019-01-01 | Stop reason: HOSPADM

## 2019-01-01 RX ORDER — LIDOCAINE HYDROCHLORIDE 10 MG/ML
0.5 INJECTION, SOLUTION EPIDURAL; INFILTRATION; INTRACAUDAL; PERINEURAL ONCE AS NEEDED
Status: DISCONTINUED | OUTPATIENT
Start: 2019-01-01 | End: 2019-01-01 | Stop reason: HOSPADM

## 2019-01-01 RX ORDER — NITROGLYCERIN 0.4 MG/1
0.4 TABLET SUBLINGUAL
Status: DISCONTINUED | OUTPATIENT
Start: 2019-01-01 | End: 2019-01-01 | Stop reason: HOSPADM

## 2019-01-01 RX ORDER — POTASSIUM CHLORIDE 7.45 MG/ML
10 INJECTION INTRAVENOUS
Status: DISCONTINUED | OUTPATIENT
Start: 2019-01-01 | End: 2019-01-01 | Stop reason: HOSPADM

## 2019-01-01 RX ORDER — FAMOTIDINE 10 MG/ML
20 INJECTION, SOLUTION INTRAVENOUS ONCE
Status: COMPLETED | OUTPATIENT
Start: 2019-01-01 | End: 2019-01-01

## 2019-01-01 RX ORDER — BISACODYL 5 MG/1
10 TABLET, DELAYED RELEASE ORAL DAILY
Status: DISCONTINUED | OUTPATIENT
Start: 2019-01-01 | End: 2019-01-01 | Stop reason: HOSPADM

## 2019-01-01 RX ORDER — POTASSIUM CHLORIDE 750 MG/1
40 CAPSULE, EXTENDED RELEASE ORAL AS NEEDED
Status: DISCONTINUED | OUTPATIENT
Start: 2019-01-01 | End: 2019-01-01 | Stop reason: HOSPADM

## 2019-01-01 RX ORDER — SODIUM CHLORIDE 9 MG/ML
75 INJECTION, SOLUTION INTRAVENOUS CONTINUOUS
Status: DISCONTINUED | OUTPATIENT
Start: 2019-01-01 | End: 2019-01-01

## 2019-01-01 RX ORDER — ACETAMINOPHEN 650 MG/1
650 SUPPOSITORY RECTAL EVERY 4 HOURS PRN
Status: DISCONTINUED | OUTPATIENT
Start: 2019-01-01 | End: 2019-01-01 | Stop reason: HOSPADM

## 2019-01-01 RX ORDER — OXYCODONE AND ACETAMINOPHEN 10; 325 MG/1; MG/1
1 TABLET ORAL EVERY 4 HOURS PRN
Qty: 9 TABLET | Refills: 0 | Status: SHIPPED | OUTPATIENT
Start: 2019-01-01 | End: 2019-01-01

## 2019-01-01 RX ORDER — PROMETHAZINE HYDROCHLORIDE 25 MG/1
25 SUPPOSITORY RECTAL ONCE AS NEEDED
Status: DISCONTINUED | OUTPATIENT
Start: 2019-01-01 | End: 2019-01-01 | Stop reason: HOSPADM

## 2019-01-01 RX ORDER — MAGNESIUM CARB/ALUMINUM HYDROX 105-160MG
150 TABLET,CHEWABLE ORAL ONCE
Status: COMPLETED | OUTPATIENT
Start: 2019-01-01 | End: 2019-01-01

## 2019-01-01 RX ORDER — LEVOTHYROXINE SODIUM 0.1 MG/1
100 TABLET ORAL
Status: DISCONTINUED | OUTPATIENT
Start: 2019-01-01 | End: 2019-01-01

## 2019-01-01 RX ORDER — ACETAMINOPHEN 160 MG/5ML
650 SOLUTION ORAL EVERY 4 HOURS PRN
Status: DISCONTINUED | OUTPATIENT
Start: 2019-01-01 | End: 2019-01-01 | Stop reason: HOSPADM

## 2019-01-01 RX ORDER — SENNA AND DOCUSATE SODIUM 50; 8.6 MG/1; MG/1
2 TABLET, FILM COATED ORAL 2 TIMES DAILY PRN
Status: DISCONTINUED | OUTPATIENT
Start: 2019-01-01 | End: 2019-01-01 | Stop reason: HOSPADM

## 2019-01-01 RX ORDER — LEVOTHYROXINE SODIUM 0.1 MG/1
TABLET ORAL
Qty: 30 TABLET | Refills: 0 | OUTPATIENT
Start: 2019-01-01

## 2019-01-01 RX ORDER — NALOXONE HCL 0.4 MG/ML
0.1 VIAL (ML) INJECTION
Status: DISCONTINUED | OUTPATIENT
Start: 2019-01-01 | End: 2019-01-01

## 2019-01-01 RX ORDER — SODIUM CHLORIDE 0.9 % (FLUSH) 0.9 %
3-10 SYRINGE (ML) INJECTION AS NEEDED
Status: DISCONTINUED | OUTPATIENT
Start: 2019-01-01 | End: 2019-01-01 | Stop reason: HOSPADM

## 2019-01-01 RX ORDER — MORPHINE SULFATE 2 MG/ML
INJECTION, SOLUTION INTRAMUSCULAR; INTRAVENOUS AS NEEDED
Status: DISCONTINUED | OUTPATIENT
Start: 2019-01-01 | End: 2019-01-01 | Stop reason: SURG

## 2019-01-01 RX ORDER — PANTOPRAZOLE SODIUM 40 MG/10ML
80 INJECTION, POWDER, LYOPHILIZED, FOR SOLUTION INTRAVENOUS ONCE
Status: COMPLETED | OUTPATIENT
Start: 2019-01-01 | End: 2019-01-01

## 2019-01-01 RX ORDER — PROPOFOL 10 MG/ML
VIAL (ML) INTRAVENOUS AS NEEDED
Status: DISCONTINUED | OUTPATIENT
Start: 2019-01-01 | End: 2019-01-01 | Stop reason: SURG

## 2019-01-01 RX ORDER — ONDANSETRON 2 MG/ML
4 INJECTION INTRAMUSCULAR; INTRAVENOUS ONCE AS NEEDED
Status: DISCONTINUED | OUTPATIENT
Start: 2019-01-01 | End: 2019-01-01 | Stop reason: HOSPADM

## 2019-01-01 RX ORDER — ROCURONIUM BROMIDE 10 MG/ML
INJECTION, SOLUTION INTRAVENOUS AS NEEDED
Status: DISCONTINUED | OUTPATIENT
Start: 2019-01-01 | End: 2019-01-01 | Stop reason: SURG

## 2019-01-01 RX ORDER — HYDROCODONE BITARTRATE AND ACETAMINOPHEN 7.5; 325 MG/1; MG/1
1 TABLET ORAL ONCE AS NEEDED
Status: DISCONTINUED | OUTPATIENT
Start: 2019-01-01 | End: 2019-01-01

## 2019-01-01 RX ORDER — SODIUM CHLORIDE, SODIUM LACTATE, POTASSIUM CHLORIDE, CALCIUM CHLORIDE 600; 310; 30; 20 MG/100ML; MG/100ML; MG/100ML; MG/100ML
INJECTION, SOLUTION INTRAVENOUS CONTINUOUS PRN
Status: DISCONTINUED | OUTPATIENT
Start: 2019-01-01 | End: 2019-01-01 | Stop reason: SURG

## 2019-01-01 RX ORDER — DEXTROSE, SODIUM CHLORIDE, AND POTASSIUM CHLORIDE 5; .45; .15 G/100ML; G/100ML; G/100ML
50 INJECTION INTRAVENOUS CONTINUOUS
Status: DISCONTINUED | OUTPATIENT
Start: 2019-01-01 | End: 2019-01-01

## 2019-01-01 RX ORDER — SODIUM CHLORIDE 0.9 % (FLUSH) 0.9 %
10 SYRINGE (ML) INJECTION AS NEEDED
Status: DISCONTINUED | OUTPATIENT
Start: 2019-01-01 | End: 2019-01-01 | Stop reason: HOSPADM

## 2019-01-01 RX ORDER — FUROSEMIDE 20 MG/1
20 TABLET ORAL DAILY
Status: DISCONTINUED | OUTPATIENT
Start: 2019-01-01 | End: 2019-01-01 | Stop reason: HOSPADM

## 2019-01-01 RX ORDER — OXYCODONE AND ACETAMINOPHEN 10; 325 MG/1; MG/1
1 TABLET ORAL EVERY 4 HOURS PRN
Status: DISCONTINUED | OUTPATIENT
Start: 2019-01-01 | End: 2019-01-01 | Stop reason: HOSPADM

## 2019-01-01 RX ORDER — LEVOTHYROXINE SODIUM 0.1 MG/1
TABLET ORAL
Qty: 30 TABLET | Refills: 0 | Status: SHIPPED | OUTPATIENT
Start: 2019-01-01 | End: 2019-01-01 | Stop reason: SDUPTHER

## 2019-01-01 RX ORDER — PANTOPRAZOLE SODIUM 40 MG/1
40 TABLET, DELAYED RELEASE ORAL
Status: DISCONTINUED | OUTPATIENT
Start: 2019-01-01 | End: 2019-01-01 | Stop reason: HOSPADM

## 2019-01-01 RX ORDER — PANTOPRAZOLE SODIUM 40 MG/10ML
40 INJECTION, POWDER, LYOPHILIZED, FOR SOLUTION INTRAVENOUS
Status: DISCONTINUED | OUTPATIENT
Start: 2019-01-01 | End: 2019-01-01

## 2019-01-01 RX ORDER — MAGNESIUM HYDROXIDE 1200 MG/15ML
LIQUID ORAL AS NEEDED
Status: DISCONTINUED | OUTPATIENT
Start: 2019-01-01 | End: 2019-01-01 | Stop reason: HOSPADM

## 2019-01-01 RX ORDER — POTASSIUM CHLORIDE 1.5 G/1.77G
40 POWDER, FOR SOLUTION ORAL AS NEEDED
Status: DISCONTINUED | OUTPATIENT
Start: 2019-01-01 | End: 2019-01-01 | Stop reason: HOSPADM

## 2019-01-01 RX ORDER — ONDANSETRON 2 MG/ML
INJECTION INTRAMUSCULAR; INTRAVENOUS AS NEEDED
Status: DISCONTINUED | OUTPATIENT
Start: 2019-01-01 | End: 2019-01-01 | Stop reason: SURG

## 2019-01-01 RX ORDER — PROMETHAZINE HYDROCHLORIDE 25 MG/ML
6.25 INJECTION, SOLUTION INTRAMUSCULAR; INTRAVENOUS ONCE AS NEEDED
Status: DISCONTINUED | OUTPATIENT
Start: 2019-01-01 | End: 2019-01-01 | Stop reason: HOSPADM

## 2019-01-01 RX ORDER — GLYCOPYRROLATE 0.2 MG/ML
INJECTION INTRAMUSCULAR; INTRAVENOUS AS NEEDED
Status: DISCONTINUED | OUTPATIENT
Start: 2019-01-01 | End: 2019-01-01 | Stop reason: SURG

## 2019-01-01 RX ORDER — MORPHINE SULFATE 2 MG/ML
INJECTION, SOLUTION INTRAMUSCULAR; INTRAVENOUS
Status: COMPLETED
Start: 2019-01-01 | End: 2019-01-01

## 2019-01-01 RX ORDER — LIDOCAINE HYDROCHLORIDE 20 MG/ML
INJECTION, SOLUTION INFILTRATION; PERINEURAL AS NEEDED
Status: DISCONTINUED | OUTPATIENT
Start: 2019-01-01 | End: 2019-01-01 | Stop reason: SURG

## 2019-01-01 RX ORDER — ACETAMINOPHEN 325 MG/1
650 TABLET ORAL EVERY 4 HOURS PRN
Status: DISCONTINUED | OUTPATIENT
Start: 2019-01-01 | End: 2019-01-01 | Stop reason: HOSPADM

## 2019-01-01 RX ORDER — FUROSEMIDE 40 MG/1
40 TABLET ORAL DAILY
Status: DISCONTINUED | OUTPATIENT
Start: 2019-01-01 | End: 2019-01-01

## 2019-01-01 RX ORDER — FENTANYL CITRATE 50 UG/ML
INJECTION, SOLUTION INTRAMUSCULAR; INTRAVENOUS AS NEEDED
Status: DISCONTINUED | OUTPATIENT
Start: 2019-01-01 | End: 2019-01-01 | Stop reason: SURG

## 2019-01-01 RX ORDER — SODIUM CHLORIDE AND POTASSIUM CHLORIDE 150; 900 MG/100ML; MG/100ML
50 INJECTION, SOLUTION INTRAVENOUS CONTINUOUS
Status: DISCONTINUED | OUTPATIENT
Start: 2019-01-01 | End: 2019-01-01

## 2019-01-01 RX ORDER — SODIUM CHLORIDE 9 MG/ML
30 INJECTION, SOLUTION INTRAVENOUS CONTINUOUS PRN
Status: DISCONTINUED | OUTPATIENT
Start: 2019-01-01 | End: 2019-01-01 | Stop reason: HOSPADM

## 2019-01-01 RX ORDER — HYDROCODONE BITARTRATE AND ACETAMINOPHEN 5; 325 MG/1; MG/1
1 TABLET ORAL EVERY 4 HOURS PRN
Status: DISCONTINUED | OUTPATIENT
Start: 2019-01-01 | End: 2019-01-01

## 2019-01-01 RX ORDER — OXYCODONE AND ACETAMINOPHEN 7.5; 325 MG/1; MG/1
1 TABLET ORAL EVERY 4 HOURS PRN
Status: DISCONTINUED | OUTPATIENT
Start: 2019-01-01 | End: 2019-01-01

## 2019-01-01 RX ORDER — OXYCODONE AND ACETAMINOPHEN 10; 325 MG/1; MG/1
2 TABLET ORAL EVERY 4 HOURS PRN
Status: DISCONTINUED | OUTPATIENT
Start: 2019-01-01 | End: 2019-01-01 | Stop reason: HOSPADM

## 2019-01-01 RX ORDER — MORPHINE SULFATE 4 MG/ML
4 INJECTION, SOLUTION INTRAMUSCULAR; INTRAVENOUS
Status: DISCONTINUED | OUTPATIENT
Start: 2019-01-01 | End: 2019-01-01 | Stop reason: HOSPADM

## 2019-01-01 RX ORDER — LEVOTHYROXINE SODIUM 137 UG/1
137 TABLET ORAL DAILY
Start: 2019-01-01

## 2019-01-01 RX ORDER — MAGNESIUM SULFATE HEPTAHYDRATE 40 MG/ML
2 INJECTION, SOLUTION INTRAVENOUS AS NEEDED
Status: DISCONTINUED | OUTPATIENT
Start: 2019-01-01 | End: 2019-01-01 | Stop reason: HOSPADM

## 2019-01-01 RX ORDER — CLINDAMYCIN PHOSPHATE 600 MG/50ML
600 INJECTION INTRAVENOUS EVERY 8 HOURS
Status: DISPENSED | OUTPATIENT
Start: 2019-01-01 | End: 2019-01-01

## 2019-01-01 RX ORDER — EPHEDRINE SULFATE 50 MG/ML
5 INJECTION, SOLUTION INTRAVENOUS ONCE AS NEEDED
Status: DISCONTINUED | OUTPATIENT
Start: 2019-01-01 | End: 2019-01-01

## 2019-01-01 RX ORDER — HYDRALAZINE HYDROCHLORIDE 20 MG/ML
5 INJECTION INTRAMUSCULAR; INTRAVENOUS
Status: DISCONTINUED | OUTPATIENT
Start: 2019-01-01 | End: 2019-01-01 | Stop reason: HOSPADM

## 2019-01-01 RX ORDER — POLYETHYLENE GLYCOL 3350 17 G/17G
17 POWDER, FOR SOLUTION ORAL 2 TIMES DAILY
Status: DISCONTINUED | OUTPATIENT
Start: 2019-01-01 | End: 2019-01-01 | Stop reason: HOSPADM

## 2019-01-01 RX ORDER — NALOXONE HCL 0.4 MG/ML
0.4 VIAL (ML) INJECTION
Status: DISCONTINUED | OUTPATIENT
Start: 2019-01-01 | End: 2019-01-01 | Stop reason: HOSPADM

## 2019-01-01 RX ORDER — SODIUM CHLORIDE 0.9 % (FLUSH) 0.9 %
10 SYRINGE (ML) INJECTION EVERY 12 HOURS SCHEDULED
Status: DISCONTINUED | OUTPATIENT
Start: 2019-01-01 | End: 2019-01-01 | Stop reason: HOSPADM

## 2019-01-01 RX ORDER — FLUMAZENIL 0.1 MG/ML
0.2 INJECTION INTRAVENOUS AS NEEDED
Status: DISCONTINUED | OUTPATIENT
Start: 2019-01-01 | End: 2019-01-01

## 2019-01-01 RX ORDER — EPHEDRINE SULFATE 50 MG/ML
INJECTION, SOLUTION INTRAVENOUS AS NEEDED
Status: DISCONTINUED | OUTPATIENT
Start: 2019-01-01 | End: 2019-01-01 | Stop reason: SURG

## 2019-01-01 RX ORDER — SODIUM CHLORIDE 0.9 % (FLUSH) 0.9 %
3 SYRINGE (ML) INJECTION EVERY 12 HOURS SCHEDULED
Status: DISCONTINUED | OUTPATIENT
Start: 2019-01-01 | End: 2019-01-01 | Stop reason: HOSPADM

## 2019-01-01 RX ORDER — MAGNESIUM SULFATE HEPTAHYDRATE 40 MG/ML
4 INJECTION, SOLUTION INTRAVENOUS AS NEEDED
Status: DISCONTINUED | OUTPATIENT
Start: 2019-01-01 | End: 2019-01-01 | Stop reason: HOSPADM

## 2019-01-01 RX ORDER — SODIUM CHLORIDE, SODIUM LACTATE, POTASSIUM CHLORIDE, CALCIUM CHLORIDE 600; 310; 30; 20 MG/100ML; MG/100ML; MG/100ML; MG/100ML
9 INJECTION, SOLUTION INTRAVENOUS CONTINUOUS
Status: DISCONTINUED | OUTPATIENT
Start: 2019-01-01 | End: 2019-01-01

## 2019-01-01 RX ADMIN — PANTOPRAZOLE SODIUM 40 MG: 40 TABLET, DELAYED RELEASE ORAL at 18:03

## 2019-01-01 RX ADMIN — POTASSIUM CHLORIDE 10 MEQ: 7.46 INJECTION, SOLUTION INTRAVENOUS at 04:08

## 2019-01-01 RX ADMIN — LEVOTHYROXINE SODIUM 137 MCG: 137 TABLET ORAL at 06:44

## 2019-01-01 RX ADMIN — CETIRIZINE HYDROCHLORIDE 5 MG: 10 TABLET, FILM COATED ORAL at 10:04

## 2019-01-01 RX ADMIN — POLYETHYLENE GLYCOL 3350 17 G: 17 POWDER, FOR SOLUTION ORAL at 08:49

## 2019-01-01 RX ADMIN — SODIUM CHLORIDE, PRESERVATIVE FREE 10 ML: 5 INJECTION INTRAVENOUS at 08:38

## 2019-01-01 RX ADMIN — LIDOCAINE HYDROCHLORIDE 60 MG: 20 INJECTION, SOLUTION INFILTRATION; PERINEURAL at 10:45

## 2019-01-01 RX ADMIN — CLINDAMYCIN PHOSPHATE 600 MG: 600 INJECTION, SOLUTION INTRAVENOUS at 17:25

## 2019-01-01 RX ADMIN — SODIUM CHLORIDE, PRESERVATIVE FREE 10 ML: 5 INJECTION INTRAVENOUS at 09:03

## 2019-01-01 RX ADMIN — DIBASIC SODIUM PHOSPHATE, MONOBASIC POTASSIUM PHOSPHATE AND MONOBASIC SODIUM PHOSPHATE 2 TABLET: 852; 155; 130 TABLET ORAL at 08:38

## 2019-01-01 RX ADMIN — CALCIUM CARBONATE-VITAMIN D TAB 500 MG-200 UNIT 1000 MG: 500-200 TAB at 21:02

## 2019-01-01 RX ADMIN — BISACODYL 10 MG: 5 TABLET ORAL at 16:23

## 2019-01-01 RX ADMIN — FENTANYL CITRATE 25 MCG: 50 INJECTION INTRAMUSCULAR; INTRAVENOUS at 17:35

## 2019-01-01 RX ADMIN — DAKIN'S SOLUTION 0.125% (QUARTER STRENGTH) 946 ML: 0.12 SOLUTION at 08:43

## 2019-01-01 RX ADMIN — SODIUM CHLORIDE, PRESERVATIVE FREE 10 ML: 5 INJECTION INTRAVENOUS at 21:56

## 2019-01-01 RX ADMIN — FUROSEMIDE 20 MG: 20 TABLET ORAL at 12:19

## 2019-01-01 RX ADMIN — CALCIUM CARBONATE-VITAMIN D TAB 500 MG-200 UNIT 1000 MG: 500-200 TAB at 21:31

## 2019-01-01 RX ADMIN — MORPHINE SULFATE 4 MG: 4 INJECTION INTRAVENOUS at 01:20

## 2019-01-01 RX ADMIN — PANTOPRAZOLE SODIUM 40 MG: 40 TABLET, DELAYED RELEASE ORAL at 06:37

## 2019-01-01 RX ADMIN — SODIUM CHLORIDE, PRESERVATIVE FREE 10 ML: 5 INJECTION INTRAVENOUS at 08:47

## 2019-01-01 RX ADMIN — DIBASIC SODIUM PHOSPHATE, MONOBASIC POTASSIUM PHOSPHATE AND MONOBASIC SODIUM PHOSPHATE 2 TABLET: 852; 155; 130 TABLET ORAL at 21:31

## 2019-01-01 RX ADMIN — Medication 150 ML: at 16:23

## 2019-01-01 RX ADMIN — POTASSIUM CHLORIDE 20 MEQ: 750 CAPSULE, EXTENDED RELEASE ORAL at 17:23

## 2019-01-01 RX ADMIN — PANTOPRAZOLE SODIUM 40 MG: 40 TABLET, DELAYED RELEASE ORAL at 18:13

## 2019-01-01 RX ADMIN — DAKIN'S SOLUTION 0.125% (QUARTER STRENGTH) 946 ML: 0.12 SOLUTION at 11:09

## 2019-01-01 RX ADMIN — MORPHINE SULFATE 2 MG: 2 INJECTION, SOLUTION INTRAMUSCULAR; INTRAVENOUS at 18:20

## 2019-01-01 RX ADMIN — CLINDAMYCIN PHOSPHATE 600 MG: 600 INJECTION, SOLUTION INTRAVENOUS at 00:46

## 2019-01-01 RX ADMIN — FUROSEMIDE 20 MG: 20 TABLET ORAL at 08:38

## 2019-01-01 RX ADMIN — SODIUM PHOSPHATE, MONOBASIC, MONOHYDRATE 20 MMOL: 276; 142 INJECTION, SOLUTION INTRAVENOUS at 12:02

## 2019-01-01 RX ADMIN — DAKIN'S SOLUTION 0.125% (QUARTER STRENGTH) 946 ML: 0.12 SOLUTION at 20:31

## 2019-01-01 RX ADMIN — ONDANSETRON 4 MG: 2 INJECTION INTRAMUSCULAR; INTRAVENOUS at 17:15

## 2019-01-01 RX ADMIN — PANTOPRAZOLE SODIUM 40 MG: 40 INJECTION, POWDER, FOR SOLUTION INTRAVENOUS at 09:26

## 2019-01-01 RX ADMIN — LEVOTHYROXINE SODIUM 137 MCG: 137 TABLET ORAL at 11:34

## 2019-01-01 RX ADMIN — CLINDAMYCIN PHOSPHATE 600 MG: 600 INJECTION, SOLUTION INTRAVENOUS at 16:30

## 2019-01-01 RX ADMIN — PANTOPRAZOLE SODIUM 40 MG: 40 TABLET, DELAYED RELEASE ORAL at 18:16

## 2019-01-01 RX ADMIN — POTASSIUM CHLORIDE 20 MEQ: 750 CAPSULE, EXTENDED RELEASE ORAL at 09:02

## 2019-01-01 RX ADMIN — ACETAMINOPHEN 650 MG: 325 TABLET, FILM COATED ORAL at 20:47

## 2019-01-01 RX ADMIN — CLINDAMYCIN PHOSPHATE 600 MG: 600 INJECTION, SOLUTION INTRAVENOUS at 08:16

## 2019-01-01 RX ADMIN — POTASSIUM CHLORIDE 20 MEQ: 750 CAPSULE, EXTENDED RELEASE ORAL at 18:23

## 2019-01-01 RX ADMIN — LEVOTHYROXINE SODIUM 137 MCG: 137 TABLET ORAL at 06:30

## 2019-01-01 RX ADMIN — OXYCODONE HYDROCHLORIDE AND ACETAMINOPHEN 1 TABLET: 7.5; 325 TABLET ORAL at 10:33

## 2019-01-01 RX ADMIN — FAMOTIDINE 20 MG: 10 INJECTION INTRAVENOUS at 15:32

## 2019-01-01 RX ADMIN — POTASSIUM CHLORIDE 20 MEQ: 750 CAPSULE, EXTENDED RELEASE ORAL at 17:37

## 2019-01-01 RX ADMIN — PANTOPRAZOLE SODIUM 40 MG: 40 TABLET, DELAYED RELEASE ORAL at 06:44

## 2019-01-01 RX ADMIN — OXYCODONE HYDROCHLORIDE AND ACETAMINOPHEN 1 TABLET: 10; 325 TABLET ORAL at 14:40

## 2019-01-01 RX ADMIN — POTASSIUM CHLORIDE 10 MEQ: 7.46 INJECTION, SOLUTION INTRAVENOUS at 22:07

## 2019-01-01 RX ADMIN — SODIUM CHLORIDE, PRESERVATIVE FREE 10 ML: 5 INJECTION INTRAVENOUS at 10:19

## 2019-01-01 RX ADMIN — DIBASIC SODIUM PHOSPHATE, MONOBASIC POTASSIUM PHOSPHATE AND MONOBASIC SODIUM PHOSPHATE 2 TABLET: 852; 155; 130 TABLET ORAL at 20:21

## 2019-01-01 RX ADMIN — POTASSIUM CHLORIDE, DEXTROSE MONOHYDRATE AND SODIUM CHLORIDE 50 ML/HR: 150; 5; 450 INJECTION, SOLUTION INTRAVENOUS at 20:52

## 2019-01-01 RX ADMIN — POTASSIUM CHLORIDE AND SODIUM CHLORIDE 75 ML/HR: 900; 150 INJECTION, SOLUTION INTRAVENOUS at 07:27

## 2019-01-01 RX ADMIN — ACETAMINOPHEN 650 MG: 325 TABLET, FILM COATED ORAL at 18:35

## 2019-01-01 RX ADMIN — FUROSEMIDE 20 MG: 20 TABLET ORAL at 10:05

## 2019-01-01 RX ADMIN — SODIUM CHLORIDE, PRESERVATIVE FREE 10 ML: 5 INJECTION INTRAVENOUS at 21:03

## 2019-01-01 RX ADMIN — DIBASIC SODIUM PHOSPHATE, MONOBASIC POTASSIUM PHOSPHATE AND MONOBASIC SODIUM PHOSPHATE 2 TABLET: 852; 155; 130 TABLET ORAL at 08:45

## 2019-01-01 RX ADMIN — CETIRIZINE HYDROCHLORIDE 5 MG: 10 TABLET, FILM COATED ORAL at 09:48

## 2019-01-01 RX ADMIN — CALCIUM CARBONATE-VITAMIN D TAB 500 MG-200 UNIT 1000 MG: 500-200 TAB at 09:48

## 2019-01-01 RX ADMIN — DIATRIZOATE MEGLUMINE AND DIATRIZOATE SODIUM 30 ML: 600; 100 SOLUTION ORAL; RECTAL at 09:48

## 2019-01-01 RX ADMIN — PROPOFOL 120 MG: 10 INJECTION, EMULSION INTRAVENOUS at 10:45

## 2019-01-01 RX ADMIN — LEVOTHYROXINE SODIUM 137 MCG: 137 TABLET ORAL at 06:31

## 2019-01-01 RX ADMIN — CALCIUM CARBONATE-VITAMIN D TAB 500 MG-200 UNIT 1000 MG: 500-200 TAB at 10:21

## 2019-01-01 RX ADMIN — POTASSIUM CHLORIDE 40 MEQ: 750 CAPSULE, EXTENDED RELEASE ORAL at 09:27

## 2019-01-01 RX ADMIN — LEVOTHYROXINE SODIUM 137 MCG: 137 TABLET ORAL at 06:41

## 2019-01-01 RX ADMIN — SODIUM CHLORIDE, PRESERVATIVE FREE 10 ML: 5 INJECTION INTRAVENOUS at 08:44

## 2019-01-01 RX ADMIN — NEOSTIGMINE METHYLSULFATE 3 MG: 1 INJECTION INTRAMUSCULAR; INTRAVENOUS; SUBCUTANEOUS at 18:10

## 2019-01-01 RX ADMIN — PANTOPRAZOLE SODIUM 40 MG: 40 INJECTION, POWDER, FOR SOLUTION INTRAVENOUS at 16:41

## 2019-01-01 RX ADMIN — FUROSEMIDE 20 MG: 20 TABLET ORAL at 09:48

## 2019-01-01 RX ADMIN — SODIUM CHLORIDE, PRESERVATIVE FREE 10 ML: 5 INJECTION INTRAVENOUS at 09:31

## 2019-01-01 RX ADMIN — CALCIUM CARBONATE-VITAMIN D TAB 500 MG-200 UNIT 1000 MG: 500-200 TAB at 12:19

## 2019-01-01 RX ADMIN — OXYCODONE HYDROCHLORIDE AND ACETAMINOPHEN 1 TABLET: 7.5; 325 TABLET ORAL at 07:33

## 2019-01-01 RX ADMIN — DIBASIC SODIUM PHOSPHATE, MONOBASIC POTASSIUM PHOSPHATE AND MONOBASIC SODIUM PHOSPHATE 2 TABLET: 852; 155; 130 TABLET ORAL at 12:34

## 2019-01-01 RX ADMIN — POTASSIUM CHLORIDE AND SODIUM CHLORIDE 75 ML/HR: 900; 150 INJECTION, SOLUTION INTRAVENOUS at 17:26

## 2019-01-01 RX ADMIN — DIBASIC SODIUM PHOSPHATE, MONOBASIC POTASSIUM PHOSPHATE AND MONOBASIC SODIUM PHOSPHATE 2 TABLET: 852; 155; 130 TABLET ORAL at 21:02

## 2019-01-01 RX ADMIN — IOPAMIDOL 85 ML: 612 INJECTION, SOLUTION INTRAVENOUS at 11:02

## 2019-01-01 RX ADMIN — POTASSIUM CHLORIDE 40 MEQ: 750 CAPSULE, EXTENDED RELEASE ORAL at 15:15

## 2019-01-01 RX ADMIN — SODIUM CHLORIDE, POTASSIUM CHLORIDE, SODIUM LACTATE AND CALCIUM CHLORIDE: 600; 310; 30; 20 INJECTION, SOLUTION INTRAVENOUS at 10:45

## 2019-01-01 RX ADMIN — DAKIN'S SOLUTION 0.125% (QUARTER STRENGTH) 946 ML: 0.12 SOLUTION at 20:49

## 2019-01-01 RX ADMIN — SODIUM CHLORIDE, PRESERVATIVE FREE 10 ML: 5 INJECTION INTRAVENOUS at 08:17

## 2019-01-01 RX ADMIN — POTASSIUM CHLORIDE AND SODIUM CHLORIDE 50 ML/HR: 900; 150 INJECTION, SOLUTION INTRAVENOUS at 05:45

## 2019-01-01 RX ADMIN — LEVOTHYROXINE SODIUM 137 MCG: 137 TABLET ORAL at 06:08

## 2019-01-01 RX ADMIN — SODIUM CHLORIDE, PRESERVATIVE FREE 10 ML: 5 INJECTION INTRAVENOUS at 10:23

## 2019-01-01 RX ADMIN — SODIUM CHLORIDE, PRESERVATIVE FREE 10 ML: 5 INJECTION INTRAVENOUS at 10:05

## 2019-01-01 RX ADMIN — GLYCOPYRROLATE 0.4 MG: 0.2 INJECTION INTRAMUSCULAR; INTRAVENOUS at 18:10

## 2019-01-01 RX ADMIN — CALCIUM CARBONATE-VITAMIN D TAB 500 MG-200 UNIT 1000 MG: 500-200 TAB at 08:38

## 2019-01-01 RX ADMIN — CLINDAMYCIN PHOSPHATE 600 MG: 600 INJECTION, SOLUTION INTRAVENOUS at 15:42

## 2019-01-01 RX ADMIN — LEVOTHYROXINE SODIUM 137 MCG: 137 TABLET ORAL at 06:12

## 2019-01-01 RX ADMIN — OXYCODONE HYDROCHLORIDE AND ACETAMINOPHEN 1 TABLET: 7.5; 325 TABLET ORAL at 06:41

## 2019-01-01 RX ADMIN — SENNOSIDES AND DOCUSATE SODIUM 2 TABLET: 8.6; 5 TABLET ORAL at 14:40

## 2019-01-01 RX ADMIN — PANTOPRAZOLE SODIUM 40 MG: 40 INJECTION, POWDER, FOR SOLUTION INTRAVENOUS at 12:02

## 2019-01-01 RX ADMIN — CLINDAMYCIN PHOSPHATE 600 MG: 600 INJECTION, SOLUTION INTRAVENOUS at 02:59

## 2019-01-01 RX ADMIN — PANTOPRAZOLE SODIUM 40 MG: 40 TABLET, DELAYED RELEASE ORAL at 17:39

## 2019-01-01 RX ADMIN — DIBASIC SODIUM PHOSPHATE, MONOBASIC POTASSIUM PHOSPHATE AND MONOBASIC SODIUM PHOSPHATE 2 TABLET: 852; 155; 130 TABLET ORAL at 20:10

## 2019-01-01 RX ADMIN — CETIRIZINE HYDROCHLORIDE 5 MG: 10 TABLET, FILM COATED ORAL at 16:21

## 2019-01-01 RX ADMIN — PANTOPRAZOLE SODIUM 40 MG: 40 INJECTION, POWDER, FOR SOLUTION INTRAVENOUS at 06:36

## 2019-01-01 RX ADMIN — POTASSIUM CHLORIDE 20 MEQ: 750 CAPSULE, EXTENDED RELEASE ORAL at 08:48

## 2019-01-01 RX ADMIN — POTASSIUM CHLORIDE 40 MEQ: 750 CAPSULE, EXTENDED RELEASE ORAL at 21:02

## 2019-01-01 RX ADMIN — DIBASIC SODIUM PHOSPHATE, MONOBASIC POTASSIUM PHOSPHATE AND MONOBASIC SODIUM PHOSPHATE 2 TABLET: 852; 155; 130 TABLET ORAL at 09:03

## 2019-01-01 RX ADMIN — POTASSIUM CHLORIDE 20 MEQ: 750 CAPSULE, EXTENDED RELEASE ORAL at 08:44

## 2019-01-01 RX ADMIN — OXYCODONE HYDROCHLORIDE AND ACETAMINOPHEN 1 TABLET: 7.5; 325 TABLET ORAL at 08:46

## 2019-01-01 RX ADMIN — CLINDAMYCIN PHOSPHATE 600 MG: 600 INJECTION, SOLUTION INTRAVENOUS at 00:08

## 2019-01-01 RX ADMIN — OXYCODONE HYDROCHLORIDE AND ACETAMINOPHEN 1 TABLET: 7.5; 325 TABLET ORAL at 19:30

## 2019-01-01 RX ADMIN — OXYCODONE HYDROCHLORIDE AND ACETAMINOPHEN 1 TABLET: 10; 325 TABLET ORAL at 08:48

## 2019-01-01 RX ADMIN — ROCURONIUM BROMIDE 30 MG: 10 INJECTION INTRAVENOUS at 16:41

## 2019-01-01 RX ADMIN — DIBASIC SODIUM PHOSPHATE, MONOBASIC POTASSIUM PHOSPHATE AND MONOBASIC SODIUM PHOSPHATE 2 TABLET: 852; 155; 130 TABLET ORAL at 20:34

## 2019-01-01 RX ADMIN — BISACODYL 10 MG: 5 TABLET ORAL at 08:48

## 2019-01-01 RX ADMIN — FUROSEMIDE 20 MG: 20 TABLET ORAL at 10:21

## 2019-01-01 RX ADMIN — PANTOPRAZOLE SODIUM 40 MG: 40 TABLET, DELAYED RELEASE ORAL at 06:30

## 2019-01-01 RX ADMIN — SODIUM CHLORIDE, PRESERVATIVE FREE 10 ML: 5 INJECTION INTRAVENOUS at 20:10

## 2019-01-01 RX ADMIN — PANTOPRAZOLE SODIUM 40 MG: 40 TABLET, DELAYED RELEASE ORAL at 08:49

## 2019-01-01 RX ADMIN — MORPHINE SULFATE 4 MG: 4 INJECTION INTRAVENOUS at 08:44

## 2019-01-01 RX ADMIN — PANTOPRAZOLE SODIUM 40 MG: 40 INJECTION, POWDER, FOR SOLUTION INTRAVENOUS at 17:24

## 2019-01-01 RX ADMIN — SODIUM CHLORIDE, PRESERVATIVE FREE 10 ML: 5 INJECTION INTRAVENOUS at 09:42

## 2019-01-01 RX ADMIN — SODIUM CHLORIDE, PRESERVATIVE FREE 10 ML: 5 INJECTION INTRAVENOUS at 09:40

## 2019-01-01 RX ADMIN — PANTOPRAZOLE SODIUM 80 MG: 40 INJECTION, POWDER, FOR SOLUTION INTRAVENOUS at 01:18

## 2019-01-01 RX ADMIN — POTASSIUM CHLORIDE 40 MEQ: 750 CAPSULE, EXTENDED RELEASE ORAL at 16:31

## 2019-01-01 RX ADMIN — SODIUM CHLORIDE, PRESERVATIVE FREE 10 ML: 5 INJECTION INTRAVENOUS at 21:25

## 2019-01-01 RX ADMIN — SODIUM CHLORIDE, PRESERVATIVE FREE 10 ML: 5 INJECTION INTRAVENOUS at 20:31

## 2019-01-01 RX ADMIN — DIBASIC SODIUM PHOSPHATE, MONOBASIC POTASSIUM PHOSPHATE AND MONOBASIC SODIUM PHOSPHATE 2 TABLET: 852; 155; 130 TABLET ORAL at 21:56

## 2019-01-01 RX ADMIN — POTASSIUM CHLORIDE 40 MEQ: 750 CAPSULE, EXTENDED RELEASE ORAL at 09:48

## 2019-01-01 RX ADMIN — DIBASIC SODIUM PHOSPHATE, MONOBASIC POTASSIUM PHOSPHATE AND MONOBASIC SODIUM PHOSPHATE 2 TABLET: 852; 155; 130 TABLET ORAL at 09:48

## 2019-01-01 RX ADMIN — CLINDAMYCIN PHOSPHATE 600 MG: 600 INJECTION, SOLUTION INTRAVENOUS at 18:45

## 2019-01-01 RX ADMIN — CLINDAMYCIN PHOSPHATE 600 MG: 600 INJECTION, SOLUTION INTRAVENOUS at 00:19

## 2019-01-01 RX ADMIN — DIBASIC SODIUM PHOSPHATE, MONOBASIC POTASSIUM PHOSPHATE AND MONOBASIC SODIUM PHOSPHATE 2 TABLET: 852; 155; 130 TABLET ORAL at 08:55

## 2019-01-01 RX ADMIN — CETIRIZINE HYDROCHLORIDE 5 MG: 10 TABLET, FILM COATED ORAL at 08:38

## 2019-01-01 RX ADMIN — DIBASIC SODIUM PHOSPHATE, MONOBASIC POTASSIUM PHOSPHATE AND MONOBASIC SODIUM PHOSPHATE 2 TABLET: 852; 155; 130 TABLET ORAL at 20:49

## 2019-01-01 RX ADMIN — LEVOTHYROXINE SODIUM 137 MCG: 137 TABLET ORAL at 05:41

## 2019-01-01 RX ADMIN — CALCIUM CARBONATE-VITAMIN D TAB 500 MG-200 UNIT 1000 MG: 500-200 TAB at 20:10

## 2019-01-01 RX ADMIN — SODIUM CHLORIDE, PRESERVATIVE FREE 10 ML: 5 INJECTION INTRAVENOUS at 20:21

## 2019-01-01 RX ADMIN — CLINDAMYCIN PHOSPHATE 600 MG: 600 INJECTION, SOLUTION INTRAVENOUS at 12:20

## 2019-01-01 RX ADMIN — PROPOFOL 50 MG: 10 INJECTION, EMULSION INTRAVENOUS at 16:43

## 2019-01-01 RX ADMIN — LEVOTHYROXINE SODIUM 137 MCG: 137 TABLET ORAL at 10:18

## 2019-01-01 RX ADMIN — PANTOPRAZOLE SODIUM 40 MG: 40 TABLET, DELAYED RELEASE ORAL at 17:37

## 2019-01-01 RX ADMIN — CETIRIZINE HYDROCHLORIDE 5 MG: 10 TABLET, FILM COATED ORAL at 09:03

## 2019-01-01 RX ADMIN — PANTOPRAZOLE SODIUM 40 MG: 40 TABLET, DELAYED RELEASE ORAL at 17:12

## 2019-01-01 RX ADMIN — CLINDAMYCIN PHOSPHATE 600 MG: 600 INJECTION, SOLUTION INTRAVENOUS at 10:29

## 2019-01-01 RX ADMIN — SODIUM CHLORIDE, PRESERVATIVE FREE 10 ML: 5 INJECTION INTRAVENOUS at 20:34

## 2019-01-01 RX ADMIN — LEVOTHYROXINE SODIUM 137 MCG: 137 TABLET ORAL at 12:01

## 2019-01-01 RX ADMIN — POTASSIUM CHLORIDE 10 MEQ: 7.46 INJECTION, SOLUTION INTRAVENOUS at 00:46

## 2019-01-01 RX ADMIN — CETIRIZINE HYDROCHLORIDE 5 MG: 10 TABLET, FILM COATED ORAL at 08:54

## 2019-01-01 RX ADMIN — POTASSIUM CHLORIDE AND SODIUM CHLORIDE 75 ML/HR: 900; 150 INJECTION, SOLUTION INTRAVENOUS at 20:46

## 2019-01-01 RX ADMIN — CETIRIZINE HYDROCHLORIDE 5 MG: 10 TABLET, FILM COATED ORAL at 08:49

## 2019-01-01 RX ADMIN — FENTANYL CITRATE 25 MCG: 50 INJECTION INTRAMUSCULAR; INTRAVENOUS at 17:05

## 2019-01-01 RX ADMIN — DIBASIC SODIUM PHOSPHATE, MONOBASIC POTASSIUM PHOSPHATE AND MONOBASIC SODIUM PHOSPHATE 2 TABLET: 852; 155; 130 TABLET ORAL at 10:21

## 2019-01-01 RX ADMIN — CETIRIZINE HYDROCHLORIDE 5 MG: 10 TABLET, FILM COATED ORAL at 12:01

## 2019-01-01 RX ADMIN — CALCIUM CARBONATE-VITAMIN D TAB 500 MG-200 UNIT 1000 MG: 500-200 TAB at 20:49

## 2019-01-01 RX ADMIN — CALCIUM CARBONATE-VITAMIN D TAB 500 MG-200 UNIT 1000 MG: 500-200 TAB at 08:54

## 2019-01-01 RX ADMIN — DIBASIC SODIUM PHOSPHATE, MONOBASIC POTASSIUM PHOSPHATE AND MONOBASIC SODIUM PHOSPHATE 2 TABLET: 852; 155; 130 TABLET ORAL at 12:19

## 2019-01-01 RX ADMIN — SODIUM CHLORIDE, PRESERVATIVE FREE 10 ML: 5 INJECTION INTRAVENOUS at 20:47

## 2019-01-01 RX ADMIN — PANTOPRAZOLE SODIUM 40 MG: 40 INJECTION, POWDER, FOR SOLUTION INTRAVENOUS at 17:23

## 2019-01-01 RX ADMIN — SODIUM CHLORIDE, PRESERVATIVE FREE 10 ML: 5 INJECTION INTRAVENOUS at 20:20

## 2019-01-01 RX ADMIN — OXYCODONE HYDROCHLORIDE AND ACETAMINOPHEN 1 TABLET: 7.5; 325 TABLET ORAL at 18:38

## 2019-01-01 RX ADMIN — PANTOPRAZOLE SODIUM 40 MG: 40 TABLET, DELAYED RELEASE ORAL at 06:51

## 2019-01-01 RX ADMIN — CALCIUM CARBONATE-VITAMIN D TAB 500 MG-200 UNIT 1000 MG: 500-200 TAB at 08:44

## 2019-01-01 RX ADMIN — POTASSIUM CHLORIDE 40 MEQ: 750 CAPSULE, EXTENDED RELEASE ORAL at 19:25

## 2019-01-01 RX ADMIN — POTASSIUM CHLORIDE 10 MEQ: 7.46 INJECTION, SOLUTION INTRAVENOUS at 20:19

## 2019-01-01 RX ADMIN — POLYETHYLENE GLYCOL 3350 17 G: 17 POWDER, FOR SOLUTION ORAL at 17:37

## 2019-01-01 RX ADMIN — PROPOFOL 70 MG: 10 INJECTION, EMULSION INTRAVENOUS at 16:39

## 2019-01-01 RX ADMIN — CALCIUM CARBONATE-VITAMIN D TAB 500 MG-200 UNIT 1000 MG: 500-200 TAB at 08:48

## 2019-01-01 RX ADMIN — PANTOPRAZOLE SODIUM 40 MG: 40 TABLET, DELAYED RELEASE ORAL at 18:23

## 2019-01-01 RX ADMIN — FUROSEMIDE 20 MG: 20 TABLET ORAL at 08:49

## 2019-01-01 RX ADMIN — LEVOTHYROXINE SODIUM 137 MCG: 137 TABLET ORAL at 05:17

## 2019-01-01 RX ADMIN — PANTOPRAZOLE SODIUM 40 MG: 40 INJECTION, POWDER, FOR SOLUTION INTRAVENOUS at 06:41

## 2019-01-01 RX ADMIN — FUROSEMIDE 20 MG: 20 TABLET ORAL at 08:44

## 2019-01-01 RX ADMIN — CETIRIZINE HYDROCHLORIDE 5 MG: 10 TABLET, FILM COATED ORAL at 09:27

## 2019-01-01 RX ADMIN — DIBASIC SODIUM PHOSPHATE, MONOBASIC POTASSIUM PHOSPHATE AND MONOBASIC SODIUM PHOSPHATE 2 TABLET: 852; 155; 130 TABLET ORAL at 10:05

## 2019-01-01 RX ADMIN — ACETAMINOPHEN 650 MG: 325 TABLET, FILM COATED ORAL at 22:46

## 2019-01-01 RX ADMIN — CLINDAMYCIN PHOSPHATE 600 MG: 600 INJECTION, SOLUTION INTRAVENOUS at 16:42

## 2019-01-01 RX ADMIN — DIBASIC SODIUM PHOSPHATE, MONOBASIC POTASSIUM PHOSPHATE AND MONOBASIC SODIUM PHOSPHATE 2 TABLET: 852; 155; 130 TABLET ORAL at 08:49

## 2019-01-01 RX ADMIN — POTASSIUM CHLORIDE 20 MEQ: 750 CAPSULE, EXTENDED RELEASE ORAL at 08:55

## 2019-01-01 RX ADMIN — POTASSIUM CHLORIDE AND SODIUM CHLORIDE 75 ML/HR: 900; 150 INJECTION, SOLUTION INTRAVENOUS at 03:06

## 2019-01-01 RX ADMIN — OXYCODONE HYDROCHLORIDE AND ACETAMINOPHEN 1 TABLET: 7.5; 325 TABLET ORAL at 08:37

## 2019-01-01 RX ADMIN — CETIRIZINE HYDROCHLORIDE 5 MG: 10 TABLET, FILM COATED ORAL at 10:18

## 2019-01-01 RX ADMIN — CALCIUM CARBONATE-VITAMIN D TAB 500 MG-200 UNIT 1000 MG: 500-200 TAB at 09:03

## 2019-01-01 RX ADMIN — CETIRIZINE HYDROCHLORIDE 5 MG: 10 TABLET, FILM COATED ORAL at 08:17

## 2019-01-01 RX ADMIN — CLINDAMYCIN PHOSPHATE 600 MG: 600 INJECTION, SOLUTION INTRAVENOUS at 15:27

## 2019-01-01 RX ADMIN — POTASSIUM CHLORIDE AND SODIUM CHLORIDE 75 ML/HR: 900; 150 INJECTION, SOLUTION INTRAVENOUS at 13:36

## 2019-01-01 RX ADMIN — SENNOSIDES AND DOCUSATE SODIUM 2 TABLET: 8.6; 5 TABLET ORAL at 12:05

## 2019-01-01 RX ADMIN — PANTOPRAZOLE SODIUM 40 MG: 40 INJECTION, POWDER, FOR SOLUTION INTRAVENOUS at 06:39

## 2019-01-01 RX ADMIN — FENTANYL CITRATE 25 MCG: 50 INJECTION INTRAMUSCULAR; INTRAVENOUS at 16:34

## 2019-01-01 RX ADMIN — FENTANYL CITRATE 25 MCG: 50 INJECTION INTRAMUSCULAR; INTRAVENOUS at 16:45

## 2019-01-01 RX ADMIN — CALCIUM CARBONATE-VITAMIN D TAB 500 MG-200 UNIT 1000 MG: 500-200 TAB at 21:55

## 2019-01-01 RX ADMIN — SODIUM CHLORIDE, PRESERVATIVE FREE 10 ML: 5 INJECTION INTRAVENOUS at 21:37

## 2019-01-01 RX ADMIN — POTASSIUM CHLORIDE 20 MEQ: 750 CAPSULE, EXTENDED RELEASE ORAL at 18:03

## 2019-01-01 RX ADMIN — OXYCODONE HYDROCHLORIDE AND ACETAMINOPHEN 1 TABLET: 7.5; 325 TABLET ORAL at 00:48

## 2019-01-01 RX ADMIN — CALCIUM CARBONATE-VITAMIN D TAB 500 MG-200 UNIT 1000 MG: 500-200 TAB at 20:21

## 2019-01-01 RX ADMIN — PANTOPRAZOLE SODIUM 40 MG: 40 TABLET, DELAYED RELEASE ORAL at 06:31

## 2019-01-01 RX ADMIN — SODIUM CHLORIDE, PRESERVATIVE FREE 10 ML: 5 INJECTION INTRAVENOUS at 12:21

## 2019-01-01 RX ADMIN — EPHEDRINE SULFATE 10 MG: 50 INJECTION INTRAMUSCULAR; INTRAVENOUS; SUBCUTANEOUS at 17:00

## 2019-01-01 RX ADMIN — POTASSIUM CHLORIDE 40 MEQ: 750 CAPSULE, EXTENDED RELEASE ORAL at 15:37

## 2019-01-01 RX ADMIN — CALCIUM CARBONATE-VITAMIN D TAB 500 MG-200 UNIT 1000 MG: 500-200 TAB at 12:34

## 2019-01-01 RX ADMIN — FUROSEMIDE 20 MG: 20 TABLET ORAL at 08:55

## 2019-01-01 RX ADMIN — PANTOPRAZOLE SODIUM 40 MG: 40 INJECTION, POWDER, FOR SOLUTION INTRAVENOUS at 18:26

## 2019-01-01 RX ADMIN — CALCIUM CARBONATE-VITAMIN D TAB 500 MG-200 UNIT 1000 MG: 500-200 TAB at 20:34

## 2019-01-01 RX ADMIN — CLINDAMYCIN PHOSPHATE 600 MG: 600 INJECTION, SOLUTION INTRAVENOUS at 01:07

## 2019-01-01 RX ADMIN — SODIUM CHLORIDE, PRESERVATIVE FREE 10 ML: 5 INJECTION INTRAVENOUS at 20:50

## 2019-01-01 RX ADMIN — FUROSEMIDE 40 MG: 40 TABLET ORAL at 12:33

## 2019-01-01 RX ADMIN — CLINDAMYCIN PHOSPHATE 600 MG: 600 INJECTION, SOLUTION INTRAVENOUS at 23:34

## 2019-01-01 RX ADMIN — LEVOTHYROXINE SODIUM 137 MCG: 137 TABLET ORAL at 06:39

## 2019-01-01 RX ADMIN — PANTOPRAZOLE SODIUM 40 MG: 40 TABLET, DELAYED RELEASE ORAL at 06:41

## 2019-01-01 RX ADMIN — CETIRIZINE HYDROCHLORIDE 5 MG: 10 TABLET, FILM COATED ORAL at 08:44

## 2019-01-01 RX ADMIN — FUROSEMIDE 40 MG: 40 TABLET ORAL at 08:38

## 2019-01-01 RX ADMIN — CLINDAMYCIN PHOSPHATE 600 MG: 600 INJECTION, SOLUTION INTRAVENOUS at 10:18

## 2019-01-01 RX ADMIN — PANTOPRAZOLE SODIUM 40 MG: 40 TABLET, DELAYED RELEASE ORAL at 17:22

## 2019-01-01 RX ADMIN — PANTOPRAZOLE SODIUM 40 MG: 40 TABLET, DELAYED RELEASE ORAL at 07:27

## 2019-01-01 RX ADMIN — POTASSIUM CHLORIDE 20 MEQ: 750 CAPSULE, EXTENDED RELEASE ORAL at 08:38

## 2019-01-01 RX ADMIN — MORPHINE SULFATE 2 MG: 2 INJECTION, SOLUTION INTRAMUSCULAR; INTRAVENOUS at 17:40

## 2019-01-01 RX ADMIN — LEVOTHYROXINE SODIUM 137 MCG: 137 TABLET ORAL at 07:26

## 2019-01-01 RX ADMIN — SODIUM CHLORIDE 30 ML/HR: 9 INJECTION, SOLUTION INTRAVENOUS at 10:23

## 2019-01-01 RX ADMIN — PANTOPRAZOLE SODIUM 40 MG: 40 INJECTION, POWDER, FOR SOLUTION INTRAVENOUS at 10:19

## 2019-01-01 RX ADMIN — POTASSIUM CHLORIDE 10 MEQ: 7.46 INJECTION, SOLUTION INTRAVENOUS at 05:41

## 2019-01-01 RX ADMIN — DIBASIC SODIUM PHOSPHATE, MONOBASIC POTASSIUM PHOSPHATE AND MONOBASIC SODIUM PHOSPHATE 2 TABLET: 852; 155; 130 TABLET ORAL at 21:25

## 2019-01-01 RX ADMIN — CALCIUM CARBONATE-VITAMIN D TAB 500 MG-200 UNIT 1000 MG: 500-200 TAB at 10:04

## 2019-01-01 RX ADMIN — SODIUM CHLORIDE 75 ML/HR: 9 INJECTION, SOLUTION INTRAVENOUS at 06:57

## 2019-01-01 RX ADMIN — OXYCODONE HYDROCHLORIDE AND ACETAMINOPHEN 1 TABLET: 7.5; 325 TABLET ORAL at 13:15

## 2019-01-01 RX ADMIN — FUROSEMIDE 20 MG: 20 TABLET ORAL at 09:03

## 2019-01-01 RX ADMIN — CETIRIZINE HYDROCHLORIDE 5 MG: 10 TABLET, FILM COATED ORAL at 12:19

## 2019-01-01 RX ADMIN — CETIRIZINE HYDROCHLORIDE 5 MG: 10 TABLET, FILM COATED ORAL at 10:21

## 2019-01-01 RX ADMIN — SODIUM CHLORIDE, PRESERVATIVE FREE 10 ML: 5 INJECTION INTRAVENOUS at 21:30

## 2019-01-01 RX ADMIN — POTASSIUM CHLORIDE 10 MEQ: 7.46 INJECTION, SOLUTION INTRAVENOUS at 03:09

## 2019-01-01 RX ADMIN — LEVOTHYROXINE SODIUM 137 MCG: 137 TABLET ORAL at 06:37

## 2019-01-01 RX ADMIN — ACETAMINOPHEN 650 MG: 325 TABLET, FILM COATED ORAL at 12:43

## 2019-01-01 RX ADMIN — CALCIUM CARBONATE-VITAMIN D TAB 500 MG-200 UNIT 1000 MG: 500-200 TAB at 21:25

## 2019-01-01 RX ADMIN — SODIUM CHLORIDE, PRESERVATIVE FREE 10 ML: 5 INJECTION INTRAVENOUS at 03:50

## 2019-01-01 RX ADMIN — CLINDAMYCIN PHOSPHATE 600 MG: 600 INJECTION, SOLUTION INTRAVENOUS at 08:38

## 2019-01-01 RX ADMIN — SODIUM CHLORIDE, POTASSIUM CHLORIDE, SODIUM LACTATE AND CALCIUM CHLORIDE 9 ML/HR: 600; 310; 30; 20 INJECTION, SOLUTION INTRAVENOUS at 15:30

## 2019-03-06 NOTE — PROGRESS NOTES
LISE Mayberry is a 89 y.o. female who is here for follow up of hypothyroidism general arthritis.  Reports loss of appetite and not eating well.  However weight seems to be fairly stable.  Mostly reports just no taste to food.      Review of Systems   Constitutional: Positive for appetite change, chills and unexpected weight change.   HENT: Positive for hearing loss.    Genitourinary: Positive for frequency.   Musculoskeletal: Positive for arthralgias.   Allergic/Immunologic: Positive for environmental allergies.   Psychiatric/Behavioral: Positive for agitation.   All other systems reviewed and are negative.        Past Medical History:   Diagnosis Date   • Arthritis    • Disease of thyroid gland        Past Surgical History:   Procedure Laterality Date   • ADENOIDECTOMY     • APPENDECTOMY     • HYSTERECTOMY     • TONSILLECTOMY         History reviewed. No pertinent family history.    Social History     Socioeconomic History   • Marital status: Single     Spouse name: Not on file   • Number of children: Not on file   • Years of education: Not on file   • Highest education level: Not on file   Social Needs   • Financial resource strain: Not on file   • Food insecurity - worry: Not on file   • Food insecurity - inability: Not on file   • Transportation needs - medical: Not on file   • Transportation needs - non-medical: Not on file   Occupational History   • Not on file   Tobacco Use   • Smoking status: Never Smoker   • Smokeless tobacco: Never Used   Substance and Sexual Activity   • Alcohol use: No   • Drug use: No   • Sexual activity: Defer   Other Topics Concern   • Not on file   Social History Narrative   • Not on file         Physical Exam   Constitutional: She is oriented to person, place, and time. She appears well-developed and well-nourished. No distress.   HENT:   Nose: Nose normal.   Eyes: Conjunctivae and EOM are normal. Pupils are equal, round, and reactive to light.   Neck: Normal range of  motion. Neck supple.   Cardiovascular: Normal rate, regular rhythm and normal heart sounds.   Pulmonary/Chest: Effort normal.   Abdominal: Soft.   Musculoskeletal: Normal range of motion. She exhibits no edema or deformity.   Neurological: She is alert and oriented to person, place, and time.   Skin: Skin is warm and dry.   Psychiatric: She has a normal mood and affect. Her behavior is normal. Judgment and thought content normal.   Nursing note and vitals reviewed.        Assessment/Plan    Bonnie was seen today for hypothyroidism and med refill.    Diagnoses and all orders for this visit:    Generalized osteoarthritis    Acquired hypothyroidism  -     TSH+Free T4    High risk medication use  -     CBC & Differential  -     Comprehensive Metabolic Panel    Cachectic (CMS/HCC)    Routine follow-up of above-noted medical problems.  Complains of decreased appetite because of loss of taste of food etc.  Weight remains fairly stable.  Otherwise general arthritic complaints and takes Advil as needed.  Will get routine lab and continue current therapy with follow-up every 6 months or as needed.    This note includes information entered using a voice recognition dictation system.  Though reviewed, some nonsensible errors may remain.

## 2019-03-12 NOTE — TELEPHONE ENCOUNTER
----- Message from Patricia Cornejo sent at 3/12/2019 11:04 AM EDT -----  chad in chart    levothyroxine (SYNTHROID, LEVOTHROID) 100 MCG tablet [[id number]]   Order Details   Dose, Route, Frequency: As Directed   Dispense Quantity: 30 tablet Refills: 0 Fills remaining: --         Sig: TAKE ONE TABLET BY MOUTH DAILY

## 2019-10-23 PROBLEM — F03.90 DEMENTIA WITHOUT BEHAVIORAL DISTURBANCE (HCC): Status: ACTIVE | Noted: 2019-01-01

## 2019-10-23 NOTE — PROGRESS NOTES
LISE Mayberry is a 89 y.o. female who is here for bleeding mass in right breast.  Patient unable to give history how long this is been present.  Brought in by family member who has noted for first time.  Patient has dementia making history unreliable.      Review of Systems   Constitutional: Positive for unexpected weight change.   Musculoskeletal: Positive for arthralgias.        Chronic right shoulder pain.   All other systems reviewed and are negative.        Past Medical History:   Diagnosis Date   • Arthritis    • Disease of thyroid gland        Past Surgical History:   Procedure Laterality Date   • ADENOIDECTOMY     • APPENDECTOMY     • HYSTERECTOMY     • TONSILLECTOMY         No family history on file.    Social History     Socioeconomic History   • Marital status: Single     Spouse name: Not on file   • Number of children: Not on file   • Years of education: Not on file   • Highest education level: Not on file   Tobacco Use   • Smoking status: Never Smoker   • Smokeless tobacco: Never Used   Substance and Sexual Activity   • Alcohol use: No   • Drug use: No   • Sexual activity: Defer         Physical Exam   Constitutional: She is oriented to person, place, and time. She appears well-developed.   Maintenance somewhat cachectic   HENT:   Head: Normocephalic.   Nose: Nose normal.   Mouth/Throat: Oropharynx is clear and moist.   Eyes: Conjunctivae and EOM are normal. Pupils are equal, round, and reactive to light.   Cardiovascular: Normal rate and regular rhythm.   Pulmonary/Chest: Effort normal. No respiratory distress. Right breast exhibits mass and skin change. Breasts are asymmetrical. There is breast swelling.       Abdominal: Soft. She exhibits no distension. There is no tenderness.   Genitourinary: There is breast discharge and bleeding.   Musculoskeletal: She exhibits no edema or deformity.   Lymphadenopathy:     She has no cervical adenopathy.   Neurological: She is alert and oriented to person,  place, and time.   Skin: Skin is warm and dry.   Psychiatric: She has a normal mood and affect. Her speech is normal and behavior is normal. Judgment and thought content normal. Cognition and memory are impaired.         Assessment/Plan    Bonnie was seen today for breast problem.    Diagnoses and all orders for this visit:    Acquired hypothyroidism  -     TSH+Free T4    Generalized osteoarthritis    High risk medication use  -     CBC & Differential  -     Comprehensive Metabolic Panel    Malignant neoplasm of right female breast, unspecified estrogen receptor status, unspecified site of breast (CMS/HCC)  -     CBC & Differential  -     Comprehensive Metabolic Panel  -     Ambulatory Referral to Breast Surgery      Patient presents with obvious breast cancer.  Will get routine lab and refer urgently to breast surgeon as noted above.    This note includes information entered using a voice recognition dictation system.  Though reviewed, some nonsensible errors may remain.

## 2019-10-24 NOTE — TELEPHONE ENCOUNTER
Dr Wilson agrees she will need to be admitted to the hospital to get her blood levels worked out prior to surgery.     Dr. Maldonado will speak with the hospitalitis and have the patient direct admitted.     Dr Wilson will consult on the case and preform the breast surgery once she is stable.

## 2019-10-24 NOTE — PROGRESS NOTES
Chief Complaint: Bonnie Mayberry is a 89 y.o.. female here today for Breast Discharge and Breast Mass        History of Present Illness:  Patient presents with nipple discharge. Bloody dishcharge.  The patient is a 89-year-old white female who surprisingly is able to live at home by herself.  She recently saw her primary care doctor because of some bleeding from the right breast.  She is unable to give any significant history regarding how long it has been this way.  Apparently her last mammograms were in 2008.  She denies any unusual bony pain and has had no right upper extremity swelling.  Unfortunately the patient has some underlying dementia making her history somewhat unreliable.  She has not had any recent imaging studies to look at.      Review of Systems:  Review of Systems   Skin:        The patient denies any noticeable changes to the skin of the breast.    All other systems reviewed and are negative.       Past Medical and Surgical History:  Breast Biopsy History:  Patient has not had a breast biopsy in the past.  Breast Cancer HIstory:  Patient does not have a past medical history of breast cancer.  Breast Operations, and year:  None  Social History     Tobacco Use   Smoking Status Never Smoker   Smokeless Tobacco Never Used     Obstetric History:  Patient is postmenopausal due to removal of her uterus in the following year:?   Number of pregnancies:1  Number of live births: 1  Number of abortions or miscarriages: 0  Age of delivery of first child: 21  Patient did not breast feed.  Length of time taking birth control pills:n/a  Patient has never taken hormone replacement    Past Surgical History:   Procedure Laterality Date   • ADENOIDECTOMY     • APPENDECTOMY     • HYSTERECTOMY     • TONSILLECTOMY         Past Medical History:   Diagnosis Date   • Arthritis    • Disease of thyroid gland        Prior Hospitalizations, other than for surgery or childbirth, and year:  None    Social History:  Patient is  .  Patient has one daughters. and Patient has one sons.    Family History:  History reviewed. No pertinent family history.    Vital Signs:  Vitals:    10/24/19 1313   BP: 110/60   Pulse: 91   Temp: 99.2 °F (37.3 °C)   SpO2: 97%       Medications:    Current Outpatient Prescriptions:     Current Outpatient Medications:   •  diclofenac (VOLTAREN) 1 % gel gel, APPLY 4 GRAMS ( A SMALL AMOUNT) TO AFFECTED AREA(S) TWO TIMES A DAY, Disp: 100 g, Rfl: 0  •  ibuprofen (ADVIL,MOTRIN) 600 MG tablet, Take 600 mg by mouth., Disp: , Rfl:   •  levothyroxine (SYNTHROID, LEVOTHROID) 100 MCG tablet, Take 1 tablet by mouth Daily., Disp: 30 tablet, Rfl: 5  •  loratadine (CLARITIN) 10 MG tablet, Take 1 tablet by mouth daily., Disp: , Rfl:     Physical Examination:  General Appearance:   Patient is in no distress.  She is well kept and has an thin build.   Psychiatric:  Patient with appropriate mood and affect. Alert     Breast, RIGHT:  small sized, pendulous breast. there is a fungating mass that is growing out of the nipple areolar complex centrally.  There is an odor associated with this necrotic tissue mass.  I do not see any satellite skin nodules in the breast tissue centrally is rather firm but when the breast is pulled all the way out from the chest wall, the 2 opposing areas of skin appear to come together well with no worrisome breast tissue between them.    Breast, LEFT:  small sized breast.  Breast skin is without erythema, edema, rashes.  There are no visible abnormalities upon inspection during the arm-raising maneuver or with hands on hips in the sitting position. There is no nipple retraction, discharge or nipple/areolar skin changes.There are no masses palpable in the sitting or supine positions.    Lymphatic:  There is no cervical, infraclavicular, or supraclavicular adenopathy bilaterally.  Surprisingly I do not feel any worrisome axillary adenopathy on the right side and the left side also feels  fine.  Eyes:  Pupils are round and reactive to light.  Cardiovascular:  Heart rate and rhythm are regular.  Respiratory:  Lungs are clear bilaterally with no crackles or wheezes in any lung field.  Gastrointestinal:  Abdomen is soft, nondistended, and nontender.  There was no obvious hepatosplenomegaly or abdominal mass.  There are  scars from previous surgery.    Musculoskeletal:  Good strength in all 4 extremities.   There is good range of motion in both shoulders.    Skin:  No new skin lesions or rashes on the skin excluding the breast (see breast exam above).    Assessment:  1. Breast mass, right          Plan:  The patient was accompanied by her daughter today.  The office visit lasted 30 minutes with 25 minutes spent in counseling.    The patient clearly has a locally advanced breast cancer.  We talked about the possibility of performing imaging studies and getting a needle biopsy to diagnose this.  There are times where hormone blocking therapy in an elderly patient can be helpful to avoid surgery.  I do not think that that would be possible in this case given the significant skin changes we already have.  I do think we could potentially perform a fairly simple mastectomy and because of her redundant skin pull things together easily.  I do not think that we would benefit from checking lymph nodes and  doing this procedure would be mainly for quality of life and wound issues.    I called her primary care physician, Dr. Patricio Maldonado, and discussed her situation.  Her hemoglobin is quite low and her thyroid appears to be in bad shape.  She is in need of medical tuning up before we could operate and those arrangements will be made.      CPT coding:    Next Appointment:  No Follow-up on file.            EMR Dragon/transcription disclaimer:    Much of this encounter note is an electronic transcription/translocation of spoken language to printed text.  The electronic translation of spoken language may permit  erroneous, or at times, nonsensical words or phrases to be inadvertently transcribed.  Although I have reviewed the note from such areas, some may still exist.

## 2019-10-25 PROBLEM — C50.919 BREAST CANCER (HCC): Status: ACTIVE | Noted: 2019-01-01

## 2019-10-25 PROBLEM — D64.9 ANEMIA: Status: ACTIVE | Noted: 2019-01-01

## 2019-10-25 PROBLEM — E87.1 HYPONATREMIA: Status: ACTIVE | Noted: 2019-01-01

## 2019-10-25 NOTE — TELEPHONE ENCOUNTER
Libertad called for a consult on Ms. Nicole.     She has been admitted to [facility], [place] and is in room 443    You can reach her floor at [phone] if needed

## 2019-10-26 PROBLEM — E87.6 HYPOKALEMIA: Status: ACTIVE | Noted: 2019-01-01

## 2019-10-27 PROBLEM — K27.9 PUD (PEPTIC ULCER DISEASE): Status: ACTIVE | Noted: 2019-01-01

## 2019-10-27 PROBLEM — Z66 DNR (DO NOT RESUSCITATE): Status: ACTIVE | Noted: 2019-01-01

## 2019-10-27 NOTE — ANESTHESIA PREPROCEDURE EVALUATION
Anesthesia Evaluation     Patient summary reviewed and Nursing notes reviewed   NPO Solid Status: > 8 hours  NPO Liquid Status: > 8 hours           Airway   Mallampati: II  TM distance: >3 FB  Neck ROM: full  no difficulty expected  Dental - normal exam     Pulmonary - normal exam   Cardiovascular - normal exam        Neuro/Psych  (+) dementia,     GI/Hepatic/Renal/Endo    (+)   hypothyroidism,     Musculoskeletal     Abdominal  - normal exam   Substance History      OB/GYN          Other   (+) arthritis   history of cancer remission                    Anesthesia Plan    ASA 3     MAC     Anesthetic plan, all risks, benefits, and alternatives have been provided, discussed and informed consent has been obtained with: patient.

## 2019-10-27 NOTE — ANESTHESIA POSTPROCEDURE EVALUATION
"Patient: Bonnie Mayberry    Procedure Summary     Date:  10/27/19 Room / Location:   [facility]    Anesthesia Start:  1043 Anesthesia Stop:  1102    Procedure:  ESOPHAGOGASTRODUODENOSCOPY WITH BIOPSIES (N/A Esophagus) Diagnosis:       Presbyesophagus      Hiatal hernia      Gastritis      Multiple gastric ulcers      (Anemia, unspecified type [D64.9])    Surgeon:  Mikael Rosario MD Provider:  Rich Lowry MD    Anesthesia Type:  MAC ASA Status:  3          Anesthesia Type: MAC  Last vitals  BP   126/75 (10/27/19 1130)   Temp   36.7 °C (98 °F) (10/27/19 1016)   Pulse   76 (10/27/19 1130)   Resp   16 (10/27/19 1130)     SpO2   97 % (10/27/19 1130)     Post Anesthesia Care and Evaluation    Patient location during evaluation: bedside  Patient participation: complete - patient participated  Level of consciousness: awake and alert  Pain management: adequate  Airway patency: patent  Anesthetic complications: No anesthetic complications    Cardiovascular status: acceptable  Respiratory status: acceptable  Hydration status: acceptable    Comments: /75 (BP Location: Right arm, Patient Position: Lying)   Pulse 76   Temp 36.7 °C (98 °F) (Oral)   Resp 16   Ht 160 cm (63\")   Wt 47.1 kg (103 lb 13.4 oz)   SpO2 97%   BMI 18.39 kg/m²       "

## 2019-10-29 PROBLEM — J81.0 ACUTE PULMONARY EDEMA (HCC): Status: ACTIVE | Noted: 2019-01-01

## 2019-10-29 PROBLEM — C50.111 MALIGNANT NEOPLASM OF CENTRAL PORTION OF RIGHT FEMALE BREAST (HCC): Status: ACTIVE | Noted: 2019-01-01

## 2019-10-30 NOTE — ANESTHESIA POSTPROCEDURE EVALUATION
"Patient: Bonnie Mayberry    Procedure Summary     Date:  10/30/19 Room / Location:  [facility] OR 09 / [facility] MAIN OR    Anesthesia Start:  1630 Anesthesia Stop:  1837    Procedure:  BREAST MASTECTOMY (Right Breast) Diagnosis:       Malignant neoplasm of central portion of right female breast, unspecified estrogen receptor status (CMS/HCC)      (Malignant neoplasm of central portion of right female breast, unspecified estrogen receptor status (CMS/HCC) [C50.111])    Surgeon:  Mart Wilson MD Provider:  Rich Gold MD    Anesthesia Type:  general ASA Status:  3          Anesthesia Type: general  Last vitals  BP   142/64 (10/30/19 1900)   Temp   36.5 °C (97.7 °F) (10/30/19 1833)   Pulse   61 (10/30/19 1900)   Resp   16 (10/30/19 1900)     SpO2   95 % (10/30/19 1900)     Post Anesthesia Care and Evaluation    Patient location during evaluation: bedside  Patient participation: complete - patient participated  Level of consciousness: awake and alert  Pain management: adequate  Airway patency: patent  Anesthetic complications: No anesthetic complications    Cardiovascular status: acceptable  Respiratory status: acceptable  Hydration status: acceptable    Comments: /58   Pulse 60   Temp 36.5 °C (97.7 °F) (Oral)   Resp 16   Ht 160 cm (63\")   Wt 47.1 kg (103 lb 13.4 oz)   SpO2 95%   BMI 18.39 kg/m²           "

## 2019-10-30 NOTE — ANESTHESIA PROCEDURE NOTES
Airway  Urgency: elective    Date/Time: 10/30/2019 4:47 PM  Airway not difficult    General Information and Staff    Patient location during procedure: OR  Anesthesiologist: Rich Gold MD    Indications and Patient Condition  Indications for airway management: airway protection    Preoxygenated: yes  Mask difficulty assessment: 1 - vent by mask    Final Airway Details  Final airway type: endotracheal airway      Successful airway: ETT  Cuffed: yes   Successful intubation technique: direct laryngoscopy and video laryngoscopy  Endotracheal tube insertion site: oral  Blade: Phillips  Blade size: 2  ETT size (mm): 7.0  Cormack-Lehane Classification: grade I - full view of glottis  Placement verified by: chest auscultation and capnometry   Measured from: teeth  ETT/EBT  to teeth (cm): 19  Number of attempts at approach: 2  Assessment: lips, teeth, and gum same as pre-op and atraumatic intubation    Additional Comments  Pre oxygenated  Easy mask vent  Attempted DL with phillips 2- teeth so terrible and loose aborted attempt quickly as unable to see cords without putting teeth at risk  Intubated easily then with cmac  Atraumatic intubation  + etco2  Breath sounds equal bilaterally

## 2019-10-30 NOTE — ANESTHESIA PREPROCEDURE EVALUATION
Anesthesia Evaluation     Patient summary reviewed and Nursing notes reviewed   NPO Solid Status: > 6 hours             Airway   Mallampati: II  Dental    (+) poor dentition    Comment: Multiple very loose, rotted, broken, terrible teeth  She is at great risk of losing multiple teeth just upon awakening and biting down and breaking her teeth on her own     Pulmonary - negative pulmonary ROS and normal exam    breath sounds clear to auscultation  Cardiovascular - negative cardio ROS and normal exam    ECG reviewed        Neuro/Psych  (+) dementia,     GI/Hepatic/Renal/Endo    (+)  PUD,  thyroid problem hypothyroidism    Musculoskeletal (-) negative ROS    Abdominal    Substance History - negative use     OB/GYN          Other      history of cancer active    ROS/Med Hx Other: Hypokalemia, am repeating K+                Anesthesia Plan    ASA 3     general     Anesthetic plan, all risks, benefits, and alternatives have been provided, discussed and informed consent has been obtained with: patient and healthcare power of .

## 2019-11-01 NOTE — PLAN OF CARE
Problem: Skin Injury Risk (Adult)  Goal: Skin Health and Integrity  Outcome: Ongoing (interventions implemented as appropriate)      Problem: Fall Risk (Adult)  Goal: Absence of Fall  Outcome: Ongoing (interventions implemented as appropriate)      Problem: Anemia (Adult)  Goal: Symptom Improvement  Outcome: Ongoing (interventions implemented as appropriate)      Problem: Patient Care Overview  Goal: Plan of Care Review  Outcome: Ongoing (interventions implemented as appropriate)   11/01/19 0535   Plan of Care Review   Progress no change   OTHER   Outcome Summary VSS, no c/o pain, POD1 R mastectomy - ace wrap c/d/i, TENZIN with minimal output, f/c for comfort, Clindamycin continued, plan is rehab then home with hospice, slept most of shift     Goal: Individualization and Mutuality  Outcome: Ongoing (interventions implemented as appropriate)    Goal: Discharge Needs Assessment  Outcome: Ongoing (interventions implemented as appropriate)    Goal: Interprofessional Rounds/Family Conf  Outcome: Ongoing (interventions implemented as appropriate)      Problem: Wound (Includes Pressure Injury) (Adult)  Goal: Signs and Symptoms of Listed Potential Problems Will be Absent, Minimized or Managed (Wound)  Outcome: Ongoing (interventions implemented as appropriate)      Problem: Palliative Care (Adult)  Goal: Maximized Comfort  Outcome: Ongoing (interventions implemented as appropriate)    Goal: Enhanced Quality of Life  Outcome: Ongoing (interventions implemented as appropriate)      Problem: Pain, Acute (Adult)  Goal: Identify Related Risk Factors and Signs and Symptoms  Outcome: Ongoing (interventions implemented as appropriate)    Goal: Acceptable Pain Control/Comfort Level  Outcome: Ongoing (interventions implemented as appropriate)

## 2019-11-01 NOTE — THERAPY TREATMENT NOTE
Patient Name: Bonnie Mayberry  : 1925    MRN: [id number]                              Today's Date: 2019       Admit Date: 10/24/2019    Visit Dx:     ICD-10-CM ICD-9-CM   1. Anemia, unspecified type D64.9 285.9   2. Malignant neoplasm of lower-outer quadrant of right female breast, unspecified estrogen receptor status (CMS/HCC) C50.511 174.5   3. Malignant neoplasm of central portion of right female breast, unspecified estrogen receptor status (CMS/HCC) C50.111 174.1     Patient Active Problem List   Diagnosis   • Hypothyroidism   • Generalized osteoarthritis   • Urinary incontinence   • Dementia without behavioral disturbance (CMS/HCC)   • Anemia   • Hyponatremia   • Breast cancer (CMS/HCC)   • Hypokalemia   • PUD (peptic ulcer disease)   • DNR (do not resuscitate)   • Acute pulmonary edema (CMS/HCC)   • Malignant neoplasm of central portion of right female breast (CMS/HCC)     Past Medical History:   Diagnosis Date   • Arthritis    • Disease of thyroid gland      Past Surgical History:   Procedure Laterality Date   • ADENOIDECTOMY     • APPENDECTOMY     • ENDOSCOPY N/A 10/27/2019    Procedure: ESOPHAGOGASTRODUODENOSCOPY WITH BIOPSIES;  Surgeon: Mikael Rosario MD;  Location: [facility] ENDOSCOPY;  Service: Gastroenterology   • HYSTERECTOMY     • MASTECTOMY Right 10/30/2019    Procedure: BREAST MASTECTOMY;  Surgeon: Mart Wilson MD;  Location: [facility] OR;  Service: General   • TONSILLECTOMY       General Information     Row Name 19 1649          PT Evaluation Time/Intention    Document Type  therapy note (daily note)  -AE     Mode of Treatment  physical therapy  -AE     Row Name 19 9218          General Information    Patient Profile Reviewed?  yes  -AE       User Key  (r) = Recorded By, (t) = Taken By, (c) = Cosigned By    Initials Name Provider Type    AE Merced Ohara, PT Physical Therapist        Mobility     Row Name 19 1090          Bed Mobility  Assessment/Treatment    Supine-Sit [place] (Bed Mobility)  moderate assist (50% patient effort);1 person assist  -AE     Comment (Bed Mobility)  modA  -AE     Row Name 11/01/19 1649          Transfer Assessment/Treatment    Comment (Transfers)  pt performed all transfers with CGA and FWW  -AE     Row Name 11/01/19 1649          Bed-Chair Transfer    Bed-Chair [place] (Transfers)  1 person assist;1 person to manage equipment;contact guard  -AE     Assistive Device (Bed-Chair Transfers)  walker, front-wheeled  -AE     Row Name 11/01/19 1649          Sit-Stand Transfer    Sit-Stand [place] (Transfers)  contact guard;1 person assist;1 person to manage equipment  -AE     Assistive Device (Sit-Stand Transfers)  walker, front-wheeled  -AE     Row Name 11/01/19 1649          Gait/Stairs Assessment/Training    Gait/Stairs Assessment/Training  gait/ambulation independence;gait/ambulation assistive device  -AE     [place] Level (Gait)  contact guard;1 person assist;1 person to manage equipment  -AE     Assistive Device (Gait)  walker, front-wheeled  -AE     Distance in Feet (Gait)  5 ft from bed to chair  -AE       User Key  (r) = Recorded By, (t) = Taken By, (c) = Cosigned By    Initials Name Provider Type    Merced Richards PT Physical Therapist        Obj/Interventions    No documentation.       Goals/Plan    No documentation.       Clinical Impression     Row Name 11/01/19 1651          Pain Scale: Numbers Pre/Post-Treatment    Pain Scale: Numbers, Pretreatment  0/10 - no pain  -AE     Pain Scale: Numbers, Post-Treatment  0/10 - no pain  -AE     Row Name 11/01/19 1651          Positioning and Restraints    Pre-Treatment Position  in bed  -AE     Post Treatment Position  chair  -AE     In Chair  reclined;call light within reach;encouraged to call for assist;exit alarm on  -AE       User Key  (r) = Recorded By, (t) = Taken By, (c) = Cosigned By    Initials Name Provider Type    Merced Richards  PT Physical Therapist        Outcome Measures     Row Name 11/01/19 1010          How much help from another person do you currently need...    Turning from your back to your side while in flat bed without using bedrails?  3  -AE     Moving from lying on back to sitting on the side of a flat bed without bedrails?  3  -AE     Moving to and from a bed to a chair (including a wheelchair)?  3  -AE     Standing up from a chair using your arms (e.g., wheelchair, bedside chair)?  3  -AE     Climbing 3-5 steps with a railing?  1  -AE     To walk in hospital room?  2  -AE     AM-PAC 6 Clicks Score (PT)  15  -AE       User Key  (r) = Recorded By, (t) = Taken By, (c) = Cosigned By    Initials Name Provider Type    AE Merced Ohara, PT Physical Therapist        Physical Therapy Education     Title: PT OT SLP Therapies (In Progress)     Topic: Physical Therapy (In Progress)     Point: Mobility training (In Progress)     Learning Progress Summary           Patient Acceptance, E,D, NR by  at 10/29/2019  4:55 PM    Acceptance, E,TB,D, VU,NR by  at 10/28/2019  4:07 PM    Acceptance, E,D, NR by  at 10/26/2019  4:58 PM    Acceptance, E, NR by  at 10/25/2019  9:21 AM                   Point: Body mechanics (In Progress)     Learning Progress Summary           Patient Acceptance, E,D, NR by  at 10/29/2019  4:55 PM    Acceptance, E,TB,D, VU,NR by  at 10/28/2019  4:07 PM    Acceptance, E,D, NR by  at 10/26/2019  4:58 PM    Acceptance, E, NR by  at 10/25/2019  9:21 AM                   Point: Precautions (In Progress)     Learning Progress Summary           Patient Acceptance, E,D, NR by  at 10/29/2019  4:55 PM    Acceptance, E,TB,D, VU,NR by  at 10/28/2019  4:07 PM    Acceptance, E,D, NR by  at 10/26/2019  4:58 PM    Acceptance, E, NR by  at 10/25/2019  9:21 AM                               User Key     Initials Effective Dates Name Provider Type Discipline     04/03/18 -  Xi Ferraro, PT Physical  Therapist PT     03/07/18 -  Argelia Phillips, PTA Physical Therapy Assistant PT     09/17/19 -  Heather Levy PT Physical Therapist PT              PT Recommendation and Plan     Outcome Summary: Pt required modA for bed mobility, CGA for transfers with FWW, and CGA for gait 5 ft from bed<>recliner with no complaints of pain. Will continue to progress as toleratated..     Time Calculation:   PT Charges     Row Name 11/01/19 1653             Time Calculation    Start Time  1630  -AE      Stop Time  1645  -AE      Time Calculation (min)  15 min  -AE      PT Received On  11/01/19  -AE      PT - Next Appointment  11/02/19  -AE         Time Calculation- PT    Total Timed Code Minutes- PT  15 minute(s)  -AE        User Key  (r) = Recorded By, (t) = Taken By, (c) = Cosigned By    Initials Name Provider Type    AE Merced Ohara, PT Physical Therapist        Therapy Charges for Today     Code Description Service Date Service Provider Modifiers Qty    16391340385 HC PT THER SUPP EA 15 MIN 10/31/2019 Merced Ohara, PT GP 1    86142431722 HC PT THERAPEUTIC ACT EA 15 MIN 10/31/2019 Merced Ohara, PT GP 1    99178566369 HC PT THER SUPP EA 15 MIN 11/1/2019 Merced Ohara, PT GP 1    47560357623 HC PT THERAPEUTIC ACT EA 15 MIN 11/1/2019 Merced Ohara, PT GP 1          PT G-Codes  Outcome Measure Options: AM-PAC 6 Clicks Basic Mobility (PT)  AM-PAC 6 Clicks Score (PT): 15    Merced Ohara PT  11/1/2019

## 2019-11-01 NOTE — PROGRESS NOTES
"DAILY PROGRESS NOTE  [facility]    Patient Identification:  Name: Bonnie Mayberry  Age: 89 y.o.  Sex: female  :  1925  MRN: [id number]         Primary Care Physician: Patricio Maldonado MD    Subjective: patient is drowsy, awakens to touch; family is at the bedside;   Interval History: follow up for breast mass, ulcers, anemia, underweight, dementia    Objective:    Scheduled Meds:  calcium-vitamin D 1,000 mg Oral BID   cetirizine 5 mg Oral Daily   clindamycin 600 mg Intravenous Q8H   furosemide 20 mg Oral Daily   levothyroxine 137 mcg Oral Q AM   pantoprazole 40 mg Oral BID AC   phosphorus 500 mg Oral BID   sodium chloride 10 mL Intravenous Q12H     Continuous Infusions:  sodium chloride 30 mL/hr Last Rate: Stopped (10/28/19 1939)       Vital signs in last 24 hours:  Temp:  [98 °F (36.7 °C)-98.1 °F (36.7 °C)] 98 °F (36.7 °C)  Heart Rate:  [76-96] 76  Resp:  [14-16] 14  BP: (123-128)/(62-67) 128/67    Intake/Output:    Intake/Output Summary (Last 24 hours) at 2019 1400  Last data filed at 2019 1312  Gross per 24 hour   Intake 520 ml   Output 922 ml   Net -402 ml       Exam:  /67 (BP Location: Left arm, Patient Position: Lying)   Pulse 76   Temp 98 °F (36.7 °C) (Oral)   Resp 14   Ht 160 cm (63\")   Wt 47.1 kg (103 lb 13.4 oz)   SpO2 93%   BMI 18.39 kg/m²     General Appearance:    Alert, cooperative, no distress, AAOx2; chronically ill-appearing; thin, frail                          Head:    Normocephalic, without obvious abnormality, atraumatic                           Eyes:    PERRL, conjunctiva/corneas clear, EOM's intact, both eyes                         Throat:   Lips, tongue, gums normal; oral mucosa pink and moist                           Neck:   Supple, symmetrical, trachea midline, no JVD                         Lungs:    Decreased breath sounds bilaterally, respirations unlabored                 Chest Wall:    No tenderness or deformity                        "   Heart:    Regular rate and rhythm, S1 and S2 normal, no murmur,no  Rub                                      or gallop                  Abdomen:     Soft, non-tender, bowel sounds active, no masses, no                                                        organomegaly                  Extremities:   Extremities normal, atraumatic, no cyanosis or edema                        Pulses:   Pulses palpable in all extremities                            Skin:   Skin is warm and dry,  no rashes or palpable lesions                  Neurologic:   CNII-XII intact, motor strength grossly intact, sensation grossly                                         intact to light touch, no focal deficits noted       Data Review:  Labs in chart were reviewed.  Lab Results   Component Value Date    WBC 9.34 11/01/2019    HGB 9.6 (L) 11/01/2019    HCT 30.2 (L) 11/01/2019     11/01/2019     Lab Results   Component Value Date     (L) 11/01/2019    K 3.2 (L) 11/01/2019    CL 92 (L) 11/01/2019    CO2 30.6 (H) 11/01/2019    BUN 7 (L) 11/01/2019    CREATININE 0.57 11/01/2019    GLUCOSE 84 11/01/2019     Lab Results   Component Value Date    CALCIUM 7.9 (L) 11/01/2019    MG 2.0 10/31/2019    PHOS 3.3 10/31/2019     No results found for: AST, ALT, ALKPHOS  No results found for: APTT, INR  Patient Active Problem List   Diagnosis Code   • Hypothyroidism E03.9   • Generalized osteoarthritis M15.9   • Urinary incontinence R32   • Dementia without behavioral disturbance (CMS/HCC) F03.90   • Anemia D64.9   • Hyponatremia E87.1   • Breast cancer (CMS/HCC) C50.919   • Hypokalemia E87.6   • PUD (peptic ulcer disease) K27.9   • DNR (do not resuscitate) Z66   • Acute pulmonary edema (CMS/HCC) J81.0   • Malignant neoplasm of central portion of right female breast (CMS/HCC) C50.111       Assessment:    Anemia    Hypothyroidism    Dementia without behavioral disturbance (CMS/HCC)    Hyponatremia    Breast cancer (CMS/HCC)    Hypokalemia    PUD (peptic  ulcer disease)    DNR (do not resuscitate)    Acute pulmonary edema (CMS/HCC)    Malignant neoplasm of central portion of right female breast (CMS/HCC)  underweight    Plan:  Encourage po  hgb is more stable after transfusion  Diet advanced but she has poor intake  Replace potassium  Monitor sodium  Medium risk    Germaine Crouch MD  11/1/2019  2:00 PM

## 2019-11-01 NOTE — PLAN OF CARE
Problem: Patient Care Overview  Goal: Plan of Care Review   11/01/19 1900   Coping/Psychosocial   Plan of Care Reviewed With patient;family   Plan of Care Review   Progress improving   OTHER   Outcome Summary No c/o pain or nausea today. IV abx given. D/C cath per order. Patient worked with PT. Patient eating a little and drinking fluids. Will cont. current orders.

## 2019-11-01 NOTE — PLAN OF CARE
Problem: Patient Care Overview  Goal: Plan of Care Review  Outcome: Ongoing (interventions implemented as appropriate)   11/01/19 1540   OTHER   Outcome Summary Pt required modA for bed mobility, CGA for transfers with FWW, and CGA for gait 5 ft from bed<>recliner with no complaints of pain. Will continue to progress as toleratated..

## 2019-11-01 NOTE — PROGRESS NOTES
"DAILY PROGRESS NOTE    Patient Identification:  Name: Bonnie Mayberry  Age: 89 y.o.  Sex: female  :  1925  MRN: [id number]           Date:     2019     Follow up for treatment of neglected right breast cancer    Subjective:    Interval History: The patient appears to be doing reasonably well given her age.  Her pathology report is still pending.  The drain is in place and putting out small amounts of serosanguineous fluid.  She has not had any nausea or vomiting but continues to have poor p.o. intake    Objective:    Scheduled Meds:  calcium-vitamin D 1,000 mg Oral BID   cetirizine 5 mg Oral Daily   clindamycin 600 mg Intravenous Q8H   furosemide 20 mg Oral Daily   levothyroxine 137 mcg Oral Q AM   pantoprazole 40 mg Oral BID AC   phosphorus 500 mg Oral BID   sodium chloride 10 mL Intravenous Q12H     Continuous Infusions:  sodium chloride 30 mL/hr Last Rate: Stopped (10/28/19 1939)     PRN Meds:•  acetaminophen **OR** acetaminophen **OR** acetaminophen  •  hydrALAZINE  •  magnesium sulfate **OR** magnesium sulfate **OR** magnesium sulfate  •  Morphine **AND** naloxone  •  nitroglycerin  •  ondansetron  •  ondansetron  •  oxyCODONE-acetaminophen  •  potassium chloride **OR** potassium chloride **OR** potassium chloride  •  promethazine **OR** promethazine **OR** promethazine **OR** promethazine  •  [COMPLETED] Insert peripheral IV **AND** sodium chloride  •  sodium chloride  •  sodium chloride    Vital signs in last 24 hours:  Temp:  [98 °F (36.7 °C)-98.1 °F (36.7 °C)] 98 °F (36.7 °C)  Heart Rate:  [76-96] 76  Resp:  [14-16] 14  BP: (123-128)/(62-67) 128/67    Intake/Output:    Intake/Output Summary (Last 24 hours) at 2019 1509  Last data filed at 2019 1312  Gross per 24 hour   Intake 520 ml   Output 922 ml   Net -402 ml       Physical Examination:   /67 (BP Location: Left arm, Patient Position: Lying)   Pulse 76   Temp 98 °F (36.7 °C) (Oral)   Resp 14   Ht 160 cm (63\")   Wt 47.1 " kg (103 lb 13.4 oz)   SpO2 93%   BMI 18.39 kg/m²     She   is in no distress.  .  Breast exam-the incision itself looks okay without signs of significant hematoma formation or flap necrosis.    Extremity examination is unremarkable.  Speech  normal.        Data Review:  All labs (24hrs):   Recent Results (from the past 24 hour(s))   Basic Metabolic Panel    Collection Time: 11/01/19  6:59 AM   Result Value Ref Range    Glucose 84 65 - 99 mg/dL    BUN 7 (L) 8 - 23 mg/dL    Creatinine 0.57 0.57 - 1.00 mg/dL    Sodium 130 (L) 136 - 145 mmol/L    Potassium 3.2 (L) 3.5 - 5.2 mmol/L    Chloride 92 (L) 98 - 107 mmol/L    CO2 30.6 (H) 22.0 - 29.0 mmol/L    Calcium 7.9 (L) 8.2 - 9.6 mg/dL    eGFR Non African Amer 99 >60 mL/min/1.73    BUN/Creatinine Ratio 12.3 7.0 - 25.0    Anion Gap 7.4 5.0 - 15.0 mmol/L   CBC Auto Differential    Collection Time: 11/01/19  6:59 AM   Result Value Ref Range    WBC 9.34 3.40 - 10.80 10*3/mm3    RBC 3.47 (L) 3.77 - 5.28 10*6/mm3    Hemoglobin 9.6 (L) 12.0 - 15.9 g/dL    Hematocrit 30.2 (L) 34.0 - 46.6 %    MCV 87.0 79.0 - 97.0 fL    MCH 27.7 26.6 - 33.0 pg    MCHC 31.8 31.5 - 35.7 g/dL    RDW 14.4 12.3 - 15.4 %    RDW-SD 44.3 37.0 - 54.0 fl    MPV 10.2 6.0 - 12.0 fL    Platelets 332 140 - 450 10*3/mm3    Neutrophil % 76.8 (H) 42.7 - 76.0 %    Lymphocyte % 9.2 (L) 19.6 - 45.3 %    Monocyte % 10.5 5.0 - 12.0 %    Eosinophil % 1.9 0.3 - 6.2 %    Basophil % 0.7 0.0 - 1.5 %    Immature Grans % 0.9 (H) 0.0 - 0.5 %    Neutrophils, Absolute 7.17 (H) 1.70 - 7.00 10*3/mm3    Lymphocytes, Absolute 0.86 0.70 - 3.10 10*3/mm3    Monocytes, Absolute 0.98 (H) 0.10 - 0.90 10*3/mm3    Eosinophils, Absolute 0.18 0.00 - 0.40 10*3/mm3    Basophils, Absolute 0.07 0.00 - 0.20 10*3/mm3    Immature Grans, Absolute 0.08 (H) 0.00 - 0.05 10*3/mm3    nRBC 0.0 0.0 - 0.2 /100 WBC           Assessment:    Anemia    Hypothyroidism    Dementia without behavioral disturbance (CMS/HCC)    Hyponatremia    Breast cancer  (CMS/HCC)    Hypokalemia    PUD (peptic ulcer disease)    DNR (do not resuscitate)    Acute pulmonary edema (CMS/HCC)    Malignant neoplasm of central portion of right female breast (CMS/HCC)    She appears to be recovering reasonably well.  Her potassium is still a bit low but that is a chronic problem for her.  Her hemoglobin is stable.  She is doing okay from the breast standpoint.    Plan:  We will need to talk to the family to determine whether she needs to be in some sort of assisted living or rehab for a while.    Mart Wilson MD  11/1/2019

## 2019-11-02 NOTE — PROGRESS NOTES
"DAILY PROGRESS NOTE  [facility]    Patient Identification:  Name: Bonnie Mayberry  Age: 89 y.o.  Sex: female  :  1925  MRN: [id number]         Primary Care Physician: Patricio Maldonado MD    Subjective: patient is sitting up; son is at the bedside; she is eating better today  Interval History: follow up for breast cancer, hypokalemia, underweight, dementia    Objective:    Scheduled Meds:  calcium-vitamin D 1,000 mg Oral BID   cetirizine 5 mg Oral Daily   furosemide 20 mg Oral Daily   levothyroxine 137 mcg Oral Q AM   pantoprazole 40 mg Oral BID AC   phosphorus 500 mg Oral BID   sodium chloride 10 mL Intravenous Q12H     Continuous Infusions:  sodium chloride 30 mL/hr Last Rate: Stopped (10/28/19 1939)       Vital signs in last 24 hours:  Temp:  [97.3 °F (36.3 °C)-98.1 °F (36.7 °C)] 98.1 °F (36.7 °C)  Heart Rate:  [70-76] 76  Resp:  [16] 16  BP: (134-139)/(70-74) 134/70    Intake/Output:    Intake/Output Summary (Last 24 hours) at 2019 1422  Last data filed at 2019 1345  Gross per 24 hour   Intake 340 ml   Output 2345 ml   Net -2005 ml       Exam:  /70 (BP Location: Left arm, Patient Position: Lying)   Pulse 76   Temp 98.1 °F (36.7 °C) (Oral)   Resp 16   Ht 160 cm (63\")   Wt 47.1 kg (103 lb 13.4 oz)   SpO2 94%   BMI 18.39 kg/m²     General Appearance:    Alert, cooperative, no distress, AAOx3; thin, chronically ill-appearing                          Head:    Normocephalic, without obvious abnormality, atraumatic                           Eyes:    PERRL, conjunctiva/corneas clear, EOM's intact, both eyes                         Throat:   Lips, tongue, gums normal; oral mucosa pink and moist                           Neck:   Supple, symmetrical, trachea midline, no JVD                         Lungs:    Decreased breath sounds bilaterally, respirations unlabored                 Chest Wall:    No tenderness or deformity                          Heart:    Regular rate and " rhythm, S1 and S2 normal, no murmur,no  Rub                                      or gallop                  Abdomen:     Soft, non-tender, bowel sounds active, no masses, no                                                        organomegaly                  Extremities:   Extremities normal, atraumatic, no cyanosis or edema                        Pulses:   Pulses palpable in all extremities                            Skin:   Skin is warm and dry,  no rashes or palpable lesions                  Neurologic:   CNII-XII intact, motor strength grossly intact, sensation grossly                                         intact to light touch, no focal deficits noted       Data Review:  Labs in chart were reviewed.  Lab Results   Component Value Date    WBC 6.01 11/02/2019    HGB 9.3 (L) 11/02/2019    HCT 31.2 (L) 11/02/2019     11/02/2019     Lab Results   Component Value Date     (L) 11/02/2019    K 4.1 11/02/2019    CL 96 (L) 11/02/2019    CO2 28.1 11/02/2019    BUN 7 (L) 11/02/2019    CREATININE 0.64 11/02/2019    GLUCOSE 70 11/02/2019     Lab Results   Component Value Date    CALCIUM 7.8 (L) 11/02/2019    MG 2.0 10/31/2019    PHOS 3.3 10/31/2019     No results found for: AST, ALT, ALKPHOS  No results found for: APTT, INR  Patient Active Problem List   Diagnosis Code   • Hypothyroidism E03.9   • Generalized osteoarthritis M15.9   • Urinary incontinence R32   • Dementia without behavioral disturbance (CMS/HCC) F03.90   • Anemia D64.9   • Hyponatremia E87.1   • Breast cancer (CMS/HCC) C50.919   • Hypokalemia E87.6   • PUD (peptic ulcer disease) K27.9   • DNR (do not resuscitate) Z66   • Acute pulmonary edema (CMS/HCC) J81.0   • Malignant neoplasm of central portion of right female breast (CMS/HCC) C50.111       Assessment:    Anemia    Hypothyroidism    Dementia without behavioral disturbance (CMS/HCC)    Hyponatremia    Breast cancer (CMS/HCC)    Hypokalemia    PUD (peptic ulcer disease)    DNR (do not  resuscitate)    Acute pulmonary edema (CMS/HCC)    Malignant neoplasm of central portion of right female breast (CMS/HCC)  underweight    Plan:  May need to look into rehab  Po intake is improving a little  Potassium is better today  Sodium is low but stable  Mental status is at baseline  hgb stable  Medium risk  D.w patient and son  Pt.ot to see    Germaine Crouch MD  11/2/2019  2:22 PM

## 2019-11-02 NOTE — PLAN OF CARE
"Problem: Skin Injury Risk (Adult)  Goal: Skin Health and Integrity  Outcome: Ongoing (interventions implemented as appropriate)      Problem: Fall Risk (Adult)  Goal: Absence of Fall  Outcome: Ongoing (interventions implemented as appropriate)      Problem: Patient Care Overview  Goal: Plan of Care Review  Outcome: Ongoing (interventions implemented as appropriate)   11/02/19 1435   Coping/Psychosocial   Plan of Care Reviewed With patient;daughter;son   Plan of Care Review   Progress no change   OTHER   Outcome Summary Pt up with assist to BSC, tolerates transfer well with assist x1; Fall risk, keep alarm on bed and in chair for safety, Dressing remains CDI. Family at bedside     Goal: Individualization and Mutuality  Outcome: Ongoing (interventions implemented as appropriate)   10/29/19 0418 10/29/19 1550 11/02/19 1435   Individualization   Patient Specific Preferences Pt goes by \"Bonnie\" --  --    Patient Specific Goals (Include Timeframe) Comfort care --  --    Patient Specific Interventions --  --  Monitor for safety and symptom management   Mutuality/Individual Preferences   What Anxieties, Fears, Concerns, or Questions Do You Have About Your Care? None stated --  --    What Information Would Help Us Give You More Personalized Care? None stated --  --    How Would You and/or Your Support Person Like to Participate in Your Care? --  family involved --    Mutuality/Individual Preferences   How to Address Anxieties/Fears None stated --  --        Problem: Palliative Care (Adult)  Goal: Maximized Comfort  Outcome: Ongoing (interventions implemented as appropriate)    Goal: Enhanced Quality of Life  Outcome: Ongoing (interventions implemented as appropriate)      Problem: Pain, Acute (Adult)  Goal: Acceptable Pain Control/Comfort Level  Outcome: Ongoing (interventions implemented as appropriate)        "

## 2019-11-02 NOTE — PLAN OF CARE
Problem: Skin Injury Risk (Adult)  Goal: Skin Health and Integrity  Outcome: Ongoing (interventions implemented as appropriate)      Problem: Fall Risk (Adult)  Goal: Absence of Fall  Outcome: Ongoing (interventions implemented as appropriate)      Problem: Anemia (Adult)  Goal: Symptom Improvement  Outcome: Ongoing (interventions implemented as appropriate)      Problem: Patient Care Overview  Goal: Plan of Care Review  Outcome: Ongoing (interventions implemented as appropriate)   11/02/19 0550   Coping/Psychosocial   Plan of Care Reviewed With patient   Plan of Care Review   Progress no change   OTHER   Outcome Summary Medicated with percocet x1 for c/o right shoulder pain. Fall risk, safety measures observed. VSS. Incontinent of bladder. Dressing CDI. WIll monitor for needs       Problem: Wound (Includes Pressure Injury) (Adult)  Goal: Signs and Symptoms of Listed Potential Problems Will be Absent, Minimized or Managed (Wound)  Outcome: Outcome(s) achieved Date Met: 11/02/19      Problem: Palliative Care (Adult)  Goal: Maximized Comfort  Outcome: Ongoing (interventions implemented as appropriate)    Goal: Enhanced Quality of Life  Outcome: Ongoing (interventions implemented as appropriate)      Problem: Pain, Acute (Adult)  Goal: Identify Related Risk Factors and Signs and Symptoms  Outcome: Outcome(s) achieved Date Met: 11/02/19    Goal: Acceptable Pain Control/Comfort Level  Outcome: Ongoing (interventions implemented as appropriate)

## 2019-11-02 NOTE — PROGRESS NOTES
"DAILY PROGRESS NOTE    Patient Identification:  Name: Bonnie Mayberry  Age: 89 y.o.  Sex: female  :  1925  MRN: [id number]           Date:     2019     Follow up for treatment of neglected breast cancer    Subjective:    Interval History: The patient is alert today.  She seems to be getting up in the chair and is voiding since her catheter has been removed.  She still is not eating a great deal.    Objective:    Scheduled Meds:  calcium-vitamin D 1,000 mg Oral BID   cetirizine 5 mg Oral Daily   furosemide 20 mg Oral Daily   levothyroxine 137 mcg Oral Q AM   pantoprazole 40 mg Oral BID AC   phosphorus 500 mg Oral BID   sodium chloride 10 mL Intravenous Q12H     Continuous Infusions:  sodium chloride 30 mL/hr Last Rate: Stopped (10/28/19 1939)     PRN Meds:•  acetaminophen **OR** acetaminophen **OR** acetaminophen  •  hydrALAZINE  •  magnesium sulfate **OR** magnesium sulfate **OR** magnesium sulfate  •  Morphine **AND** naloxone  •  nitroglycerin  •  ondansetron  •  ondansetron  •  oxyCODONE-acetaminophen  •  potassium chloride **OR** potassium chloride **OR** potassium chloride  •  promethazine **OR** promethazine **OR** promethazine **OR** promethazine  •  [COMPLETED] Insert peripheral IV **AND** sodium chloride  •  sodium chloride  •  sodium chloride    Vital signs in last 24 hours:  Temp:  [97.3 °F (36.3 °C)-98.1 °F (36.7 °C)] 98.1 °F (36.7 °C)  Heart Rate:  [70-76] 76  Resp:  [16] 16  BP: (134-139)/(70-74) 134/70    Intake/Output:    Intake/Output Summary (Last 24 hours) at 2019 1246  Last data filed at 2019 1153  Gross per 24 hour   Intake 220 ml   Output 2095 ml   Net -1875 ml       Physical Examination:   /70 (BP Location: Left arm, Patient Position: Lying)   Pulse 76   Temp 98.1 °F (36.7 °C) (Oral)   Resp 16   Ht 160 cm (63\")   Wt 47.1 kg (103 lb 13.4 oz)   SpO2 94%   BMI 18.39 kg/m²     She  is alert patient is in no distress..  Breast exam-the staples are intact and I " do not see any signs of infection or fluid accumulation beneath the skin flaps.  Her drain was also in place and putting out serosanguineous fluid.    Extremity examination is unremarkable.  Speech normal.        Data Review:  All labs (24hrs):   Recent Results (from the past 24 hour(s))   Basic Metabolic Panel    Collection Time: 11/02/19  5:45 AM   Result Value Ref Range    Glucose 70 65 - 99 mg/dL    BUN 7 (L) 8 - 23 mg/dL    Creatinine 0.64 0.57 - 1.00 mg/dL    Sodium 132 (L) 136 - 145 mmol/L    Potassium 4.1 3.5 - 5.2 mmol/L    Chloride 96 (L) 98 - 107 mmol/L    CO2 28.1 22.0 - 29.0 mmol/L    Calcium 7.8 (L) 8.2 - 9.6 mg/dL    eGFR Non African Amer 87 >60 mL/min/1.73    BUN/Creatinine Ratio 10.9 7.0 - 25.0    Anion Gap 7.9 5.0 - 15.0 mmol/L   CBC (No Diff)    Collection Time: 11/02/19  5:45 AM   Result Value Ref Range    WBC 6.01 3.40 - 10.80 10*3/mm3    RBC 3.50 (L) 3.77 - 5.28 10*6/mm3    Hemoglobin 9.3 (L) 12.0 - 15.9 g/dL    Hematocrit 31.2 (L) 34.0 - 46.6 %    MCV 89.1 79.0 - 97.0 fL    MCH 26.6 26.6 - 33.0 pg    MCHC 29.8 (L) 31.5 - 35.7 g/dL    RDW 14.4 12.3 - 15.4 %    RDW-SD 46.4 37.0 - 54.0 fl    MPV 10.4 6.0 - 12.0 fL    Platelets 283 140 - 450 10*3/mm3           Assessment:    Anemia    Hypothyroidism    Dementia without behavioral disturbance (CMS/HCC)    Hyponatremia    Breast cancer (CMS/HCC)    Hypokalemia    PUD (peptic ulcer disease)    DNR (do not resuscitate)    Acute pulmonary edema (CMS/HCC)    Malignant neoplasm of central portion of right female breast (CMS/HCC)    She continues to make nice progress from her surgery.  Pathology is pending.  Her potassium is 4.1 today and her hemoglobin is stable at 9.3.    Plan:  Hopefully on Monday some decisions can be made regarding placement.    Mart Wilson MD  11/2/2019

## 2019-11-03 NOTE — PROGRESS NOTES
"DAILY PROGRESS NOTE  [facility]    Patient Identification:  Name: Bonnie Mayberry  Age: 89 y.o.  Sex: female  :  1925  MRN: [id number]         Primary Care Physician: Patricio Maldonado MD    Subjective: patient is alert; worked a little with therapy earlier; po intake is a little better today  Interval History: follow up for breast cancer, dementia, underweight, weakness     Objective:    Scheduled Meds:  calcium-vitamin D 1,000 mg Oral BID   cetirizine 5 mg Oral Daily   furosemide 20 mg Oral Daily   levothyroxine 137 mcg Oral Q AM   pantoprazole 40 mg Oral BID AC   phosphorus 500 mg Oral BID   sodium chloride 10 mL Intravenous Q12H     Continuous Infusions:  sodium chloride 30 mL/hr Last Rate: Stopped (10/28/19 1939)       Vital signs in last 24 hours:  Temp:  [97.3 °F (36.3 °C)-98.1 °F (36.7 °C)] 98.1 °F (36.7 °C)  Heart Rate:  [72-80] 72  Resp:  [16-18] 16  BP: (122-135)/(64-71) 135/68    Intake/Output:    Intake/Output Summary (Last 24 hours) at 11/3/2019 1622  Last data filed at 11/3/2019 1612  Gross per 24 hour   Intake 180 ml   Output 500 ml   Net -320 ml       Exam:  /68 (BP Location: Left arm, Patient Position: Lying)   Pulse 72   Temp 98.1 °F (36.7 °C) (Oral)   Resp 16   Ht 160 cm (63\")   Wt 47.1 kg (103 lb 13.4 oz)   SpO2 93%   BMI 18.39 kg/m²     General Appearance:    Alert, cooperative, no distress, AAOx3; thin, faril                          Head:    Normocephalic, without obvious abnormality, atraumatic                           Eyes:    PERRL, conjunctiva/corneas clear, EOM's intact, both eyes                         Throat:   Lips, tongue, gums normal; oral mucosa pink and moist                           Neck:   Supple, symmetrical, trachea midline, no JVD                         Lungs:    Decreased breath sounds bilaterally, respirations unlabored                 Chest Wall:    No tenderness or deformity                          Heart:    Regular rate and " rhythm, S1 and S2 normal, no murmur,no  Rub                                      or gallop                  Abdomen:     Soft, non-tender, bowel sounds active, no masses, no                                                        organomegaly                  Extremities:   Extremities normal, atraumatic, no cyanosis or edema                        Pulses:   Pulses palpable in all extremities                            Skin:   Skin is warm and dry,  no rashes or palpable lesions                  Neurologic:   CNII-XII intact, motor strength grossly intact, sensation grossly                                         intact to light touch, no focal deficits noted       Data Review:  Labs in chart were reviewed.  Lab Results   Component Value Date    WBC 6.01 11/02/2019    HGB 9.3 (L) 11/02/2019    HCT 31.2 (L) 11/02/2019     11/02/2019     Lab Results   Component Value Date     11/03/2019    K 3.1 (L) 11/03/2019    CL 98 11/03/2019    CO2 30.2 (H) 11/03/2019    BUN 7 (L) 11/03/2019    CREATININE 0.60 11/03/2019    GLUCOSE 89 11/03/2019     Lab Results   Component Value Date    CALCIUM 7.8 (L) 11/03/2019     No results found for: AST, ALT, ALKPHOS  Patient Active Problem List   Diagnosis Code   • Hypothyroidism E03.9   • Generalized osteoarthritis M15.9   • Urinary incontinence R32   • Dementia without behavioral disturbance (CMS/HCC) F03.90   • Anemia D64.9   • Hyponatremia E87.1   • Breast cancer (CMS/HCC) C50.919   • Hypokalemia E87.6   • PUD (peptic ulcer disease) K27.9   • DNR (do not resuscitate) Z66   • Acute pulmonary edema (CMS/HCC) J81.0   • Malignant neoplasm of central portion of right female breast (CMS/HCC) C50.111       Assessment:    Anemia    Hypothyroidism    Dementia without behavioral disturbance (CMS/HCC)    Hyponatremia    Breast cancer (CMS/HCC)    Hypokalemia    PUD (peptic ulcer disease)    DNR (do not resuscitate)    Acute pulmonary edema (CMS/HCC)    Malignant neoplasm of central  portion of right female breast (CMS/HCC)      Plan:  Breast mass resected and path pending  May need rehab after discharge  D.w family  She had falls prior to admission  Encourage po intake  She lives alone and will likely not be able to return initially at least  Medium risk      Germaine Crouch MD  11/3/2019  4:22 PM

## 2019-11-03 NOTE — PLAN OF CARE
Problem: Skin Injury Risk (Adult)  Goal: Skin Health and Integrity  Outcome: Ongoing (interventions implemented as appropriate)      Problem: Fall Risk (Adult)  Goal: Absence of Fall  Outcome: Ongoing (interventions implemented as appropriate)      Problem: Anemia (Adult)  Goal: Symptom Improvement  Outcome: Ongoing (interventions implemented as appropriate)      Problem: Patient Care Overview  Goal: Plan of Care Review  Outcome: Ongoing (interventions implemented as appropriate)   11/03/19 0550   Coping/Psychosocial   Plan of Care Reviewed With patient   Plan of Care Review   Progress no change   OTHER   Outcome Summary No c/o pain. VSS. Falls precautions. Dressing CDI. Will monitor for needs       Problem: Palliative Care (Adult)  Goal: Maximized Comfort  Outcome: Ongoing (interventions implemented as appropriate)    Goal: Enhanced Quality of Life  Outcome: Ongoing (interventions implemented as appropriate)      Problem: Pain, Acute (Adult)  Goal: Acceptable Pain Control/Comfort Level  Outcome: Ongoing (interventions implemented as appropriate)

## 2019-11-03 NOTE — PLAN OF CARE
Problem: Patient Care Overview  Goal: Plan of Care Review   11/03/19 1509 11/03/19 1712   Coping/Psychosocial   Plan of Care Reviewed With patient;family --    Plan of Care Review   Progress improving --    OTHER   Outcome Summary --  Medicated for pain x1. Patient getting up to bsc x1. K replaced. Cont. to monitor.

## 2019-11-03 NOTE — THERAPY TREATMENT NOTE
Patient Name: Bonnie Mayberry  : 1925    MRN: [id number]                              Today's Date: 11/3/2019       Admit Date: 10/24/2019    Visit Dx:     ICD-10-CM ICD-9-CM   1. Anemia, unspecified type D64.9 285.9   2. Malignant neoplasm of lower-outer quadrant of right female breast, unspecified estrogen receptor status (CMS/HCC) C50.511 174.5   3. Malignant neoplasm of central portion of right female breast, unspecified estrogen receptor status (CMS/HCC) C50.111 174.1     Patient Active Problem List   Diagnosis   • Hypothyroidism   • Generalized osteoarthritis   • Urinary incontinence   • Dementia without behavioral disturbance (CMS/HCC)   • Anemia   • Hyponatremia   • Breast cancer (CMS/HCC)   • Hypokalemia   • PUD (peptic ulcer disease)   • DNR (do not resuscitate)   • Acute pulmonary edema (CMS/HCC)   • Malignant neoplasm of central portion of right female breast (CMS/HCC)     Past Medical History:   Diagnosis Date   • Arthritis    • Disease of thyroid gland      Past Surgical History:   Procedure Laterality Date   • ADENOIDECTOMY     • APPENDECTOMY     • ENDOSCOPY N/A 10/27/2019    Procedure: ESOPHAGOGASTRODUODENOSCOPY WITH BIOPSIES;  Surgeon: Mikael Rosario MD;  Location: [facility] ENDOSCOPY;  Service: Gastroenterology   • HYSTERECTOMY     • MASTECTOMY Right 10/30/2019    Procedure: BREAST MASTECTOMY;  Surgeon: Mart Wilson MD;  Location: [facility] OR;  Service: General   • TONSILLECTOMY       General Information     Row Name 19 1506          PT Evaluation Time/Intention    Document Type  therapy note (daily note)  -EJ     Mode of Treatment  physical therapy  -EJ       User Key  (r) = Recorded By, (t) = Taken By, (c) = Cosigned By    Initials Name Provider Type    Thu Frank, PT Physical Therapist        Mobility     Row Name 19 1508          Bed Mobility Assessment/Treatment    Supine-Sit [place] (Bed Mobility)  verbal cues;moderate assist (50% patient  effort)  -EJ     Sit-Supine [place] (Bed Mobility)  verbal cues;moderate assist (50% patient effort)  -EJ     Assistive Device (Bed Mobility)  bed rails  -EJ     Row Name 11/03/19 1507          Sit-Stand Transfer    Sit-Stand [place] (Transfers)  verbal cues;minimum assist (75% patient effort)  -EJ     Assistive Device (Sit-Stand Transfers)  walker, front-wheeled  -EJ     Row Name 11/03/19 1507          Gait/Stairs Assessment/Training    Gait/Stairs Assessment/Training  gait/ambulation independence  -EJ     [place] Level (Gait)  verbal cues;minimum assist (75% patient effort)  -EJ     Assistive Device (Gait)  walker, front-wheeled  -EJ     Distance in Feet (Gait)  20  -EJ     Deviations/Abnormal Patterns (Gait)  tasia decreased;stride length decreased;festinating/shuffling  -EJ     Bilateral Gait Deviations  forward flexed posture;heel strike decreased  -EJ     Comment (Gait/Stairs)  cues for upright posture, increased B step length, and assist for walker management  -EJ       User Key  (r) = Recorded By, (t) = Taken By, (c) = Cosigned By    Initials Name Provider Type    EJ Thu Montgomery, PT Physical Therapist        Obj/Interventions    No documentation.       Goals/Plan    No documentation.       Clinical Impression     Row Name 11/03/19 1508          Pain Scale: Numbers Pre/Post-Treatment    Pain Scale: Numbers, Pretreatment  0/10 - no pain  -EJ     Pain Scale: Numbers, Post-Treatment  0/10 - no pain  -EJ     Row Name 11/03/19 1508          Plan of Care Review    Plan of Care Reviewed With  patient;family  -     Row Name 11/03/19 1508          Vital Signs    O2 Delivery Pre Treatment  room air  -EJ     O2 Delivery Intra Treatment  room air  -EJ     O2 Delivery Post Treatment  room air  -EJ     Pre Patient Position  Supine  -EJ     Intra Patient Position  Standing  -EJ     Post Patient Position  Supine  -EJ     Row Name 11/03/19 1508          Positioning and Restraints    Pre-Treatment  Position  in bed  -EJ     Post Treatment Position  bed  -EJ     In Bed  notified nsg;supine;encouraged to call for assist;call light within reach;exit alarm on;with family/caregiver  -LISBET       User Key  (r) = Recorded By, (t) = Taken By, (c) = Cosigned By    Initials Name Provider Type    Thu Frank, PT Physical Therapist        Outcome Measures     Row Name 11/03/19 1511          How much help from another person do you currently need...    Turning from your back to your side while in flat bed without using bedrails?  3  -EJ     Moving from lying on back to sitting on the side of a flat bed without bedrails?  3  -EJ     Moving to and from a bed to a chair (including a wheelchair)?  3  -EJ     Standing up from a chair using your arms (e.g., wheelchair, bedside chair)?  3  -EJ     Climbing 3-5 steps with a railing?  1  -EJ     To walk in hospital room?  2  -EJ     AM-PAC 6 Clicks Score (PT)  15  -EJ       User Key  (r) = Recorded By, (t) = Taken By, (c) = Cosigned By    Initials Name Provider Type    Thu Frank, PT Physical Therapist        Physical Therapy Education     Title: PT OT SLP Therapies (In Progress)     Topic: Physical Therapy (In Progress)     Point: Mobility training (Done)     Learning Progress Summary           Patient Acceptance, E,TB,D, VU,NR by LISBET at 11/3/2019  3:08 PM    Acceptance, E,D, NR by SINDHU at 10/29/2019  4:55 PM    Acceptance, E,TB,D, VU,NR by TAMIKA at 10/28/2019  4:07 PM    Acceptance, E,D, NR by  at 10/26/2019  4:58 PM    Acceptance, E, NR by ARLETTE at 10/25/2019  9:21 AM   Family Acceptance, E,TB,D, VU,NR by LISBET at 11/3/2019  3:08 PM                   Point: Body mechanics (In Progress)     Learning Progress Summary           Patient Acceptance, E,D, NR by SINDHU at 10/29/2019  4:55 PM    Acceptance, E,TB,D, VU,NR by  at 10/28/2019  4:07 PM    Acceptance, E,D, NR by TAMIKA at 10/26/2019  4:58 PM    Acceptance, E, NR by ARLETTE at 10/25/2019  9:21 AM                   Point:  Precautions (In Progress)     Learning Progress Summary           Patient Acceptance, E,D, NR by  at 10/29/2019  4:55 PM    Acceptance, E,TB,D, VU,NR by  at 10/28/2019  4:07 PM    Acceptance, E,D, NR by  at 10/26/2019  4:58 PM    Acceptance, E, NR by  at 10/25/2019  9:21 AM                               User Key     Initials Effective Dates Name Provider Type Discipline    PC 04/03/18 -  Xi Ferraro, PT Physical Therapist PT     04/03/18 -  Thu Montgomery, PT Physical Therapist PT     03/07/18 -  Argelia Phillips, PTA Physical Therapy Assistant PT     09/17/19 -  Heather Levy, PT Physical Therapist PT              PT Recommendation and Plan     Plan of Care Reviewed With: patient, family  Progress: improving  Outcome Summary: Pt agreeable to PT after encouragement from therapist and family. Pt was able to increase ambulation distance today.  Still unsteady and requiring cues and assistance for gait mechanics.Will continue to progress as tolerated.     Time Calculation:   PT Charges     Row Name 11/03/19 1511             Time Calculation    Start Time  1445  -EJ      Stop Time  1502  -EJ      Time Calculation (min)  17 min  -EJ      PT Received On  11/03/19  -EJ      PT - Next Appointment  11/04/19  -EJ        User Key  (r) = Recorded By, (t) = Taken By, (c) = Cosigned By    Initials Name Provider Type    EJ Thu Montgomery, PT Physical Therapist        Therapy Charges for Today     Code Description Service Date Service Provider Modifiers Qty    [id number] HC PT THER PROC EA 15 MIN 11/3/2019 Thu Montgomery, PT GP 1          PT G-Codes  Outcome Measure Options: AM-PAC 6 Clicks Basic Mobility (PT)  AM-PAC 6 Clicks Score (PT): 15    Thu Montgomery PT  11/3/2019

## 2019-11-03 NOTE — PLAN OF CARE
Problem: Patient Care Overview  Goal: Plan of Care Review  Outcome: Ongoing (interventions implemented as appropriate)   11/03/19 3128   Coping/Psychosocial   Plan of Care Reviewed With patient;family   Plan of Care Review   Progress improving   OTHER   Outcome Summary Pt agreeable to PT after encouragement from therapist and family. Pt was able to increase ambulation distance today. Still unsteady and requiring cues and assistance for gait mechanics.Will continue to progress as tolerated.

## 2019-11-04 NOTE — THERAPY EVALUATION
Acute Care - Occupational Therapy Initial Evaluation  [facility]     Patient Name: Bonnie Mayberry  : 1925  MRN: [id number]  Today's Date: 2019             Admit Date: 10/24/2019       ICD-10-CM ICD-9-CM   1. Anemia, unspecified type D64.9 285.9   2. Malignant neoplasm of lower-outer quadrant of right female breast, unspecified estrogen receptor status (CMS/HCC) C50.511 174.5   3. Malignant neoplasm of central portion of right female breast, unspecified estrogen receptor status (CMS/HCC) C50.111 174.1     Patient Active Problem List   Diagnosis   • Hypothyroidism   • Generalized osteoarthritis   • Urinary incontinence   • Dementia without behavioral disturbance (CMS/HCC)   • Anemia   • Hyponatremia   • Breast cancer (CMS/HCC)   • Hypokalemia   • PUD (peptic ulcer disease)   • DNR (do not resuscitate)   • Acute pulmonary edema (CMS/HCC)   • Malignant neoplasm of central portion of right female breast (CMS/HCC)     Past Medical History:   Diagnosis Date   • Arthritis    • Disease of thyroid gland      Past Surgical History:   Procedure Laterality Date   • ADENOIDECTOMY     • APPENDECTOMY     • ENDOSCOPY N/A 10/27/2019    Procedure: ESOPHAGOGASTRODUODENOSCOPY WITH BIOPSIES;  Surgeon: Mikael Rosario MD;  Location: [facility] ENDOSCOPY;  Service: Gastroenterology   • HYSTERECTOMY     • MASTECTOMY Right 10/30/2019    Procedure: BREAST MASTECTOMY;  Surgeon: Mart Wilson MD;  Location: [facility] MAIN OR;  Service: General   • TONSILLECTOMY            OT ASSESSMENT FLOWSHEET (last 12 hours)      Occupational Therapy Evaluation     Row Name 19 1408                   OT Evaluation Time/Intention    Subjective Information  no complaints  -SG        Document Type  evaluation  -SG        Mode of Treatment  individual therapy;occupational therapy  -SG        Patient Effort  good  -SG           General Information    Patient Profile Reviewed?  yes  -SG        Patient Observations   alert;cooperative;agree to therapy  -        Prior Level of Function  independent:;ADL's  -        Existing Precautions/Restrictions  fall  -           Cognitive Assessment/Intervention- PT/OT    Orientation Status (Cognition)  person;place;situation  -           Safety Issues, Functional Mobility    Impairments Affecting Function (Mobility)  balance;endurance/activity tolerance;strength  -SG           Bed Mobility Assessment/Treatment    Sit-Supine [place] (Bed Mobility)  moderate assist (50% patient effort);verbal cues;nonverbal cues (demo/gesture)  -           Transfer Assessment/Treatment    Transfer Assessment/Treatment  toilet transfer;sit-stand transfer;stand-sit transfer;chair-bed transfer  -           Chair-Bed Transfer    Chair-Bed [place] (Transfers)  minimum assist (75% patient effort)  -        Assistive Device (Chair-Bed Transfers)  -- transfer from C to bed   -           Sit-Stand Transfer    Sit-Stand [place] (Transfers)  minimum assist (75% patient effort);verbal cues;nonverbal cues (demo/gesture)  -           Stand-Sit Transfer    Stand-Sit [place] (Transfers)  minimum assist (75% patient effort)  -           ADL Assessment/Intervention    BADL Assessment/Intervention  grooming;toileting  -           Grooming Assessment/Training    [place] Level (Grooming)  grooming skills;wash face, hands;supervision;set up  -        Grooming Position  supported sitting  -           Toileting Assessment/Training    [place] Level (Toileting)  toileting skills;minimum assist (75% patient effort)  -        Assistive Devices (Toileting)  commode, bedside without drop arms  -        Toileting Position  supported sitting  -           BADL Safety/Performance    Impairments, BADL Safety/Performance  balance;endurance/activity tolerance;strength  -        Skilled BADL Treatment/Intervention  BADL process/adaptation training  -           General ROM     GENERAL ROM COMMENTS  brenda shld limited to 1/2 AROM  -SG           Static Sitting Balance    Level of [place] (Unsupported Sitting, Static Balance)  contact guard assist  -SG        Sitting Position (Unsupported Sitting, Static Balance)  sitting on edge of bed  -SG           Dynamic Sitting Balance    Level of [place], Reaches Outside Midline (Sitting, Dynamic Balance)  contact guard assist  -SG        Sitting Position, Reaches Outside Midline (Sitting, Dynamic Balance)  sitting on edge of bed  -SG           Static Standing Balance    Level of [place] (Supported Standing, Static Balance)  minimal assist, 75% patient effort  -SG           Dynamic Standing Balance    Level of [place], Reaches Outside Midline (Standing, Dynamic Balance)  minimal assist, 75% patient effort  -SG           Positioning and Restraints    Pre-Treatment Position  bedside commode  -SG        Post Treatment Position  bed  -SG        In Bed  call light within reach;encouraged to call for assist;exit alarm on;with family/caregiver  -SG           Pain Scale: Numbers Pre/Post-Treatment    Pain Scale: Numbers, Pretreatment  0/10 - no pain  -SG        Pain Scale: Numbers, Post-Treatment  0/10 - no pain  -SG           Wound 10/25/19 0315 Left coccyx Pressure Injury    Wound - Properties Group Date first assessed: 10/25/19  -JG Time first assessed: 0315  -JG Present on Hospital Admission: Y  -JG Side: Left  -JG Location: coccyx  -JG Primary Wound Type: Pressure inj  -JG Stage, Pressure Injury: Stage 1  -JG       Wound 10/25/19 0330 Right breast Soft Tissue Necrosis    Wound - Properties Group Date first assessed: 10/25/19  -JG Time first assessed: 0330  -JG Present on Hospital Admission: Y  -JG Side: Right  -JG Location: breast  -JG Primary Wound Type: Soft tissue  -JG Stage, Pressure Injury: other (see comments)  -JG, fungagting, odorous.        Wound 10/30/19 1712 Right breast Incision    Wound - Properties Group Date first  assessed: 10/30/19  - Time first assessed: 1712  - Side: Right  - Location: breast  -SM Primary Wound Type: Incision  -SM       Clinical Impression (OT)    OT Diagnosis  need for assist with personal care  -        Criteria for Skilled Therapeutic Interventions Met (OT Eval)  yes;treatment indicated  -        Therapy Frequency (OT Eval)  5 times/wk  -SG        Care Plan Review (OT)  evaluation/treatment results reviewed  -SG           Planned OT Interventions    Planned Therapy Interventions (OT Eval)  activity tolerance training;BADL retraining;transfer/mobility retraining  -SG           OT Goals    Bed Mobility Goal Selection (OT)  bed mobility, OT goal 1  -SG        Transfer Goal Selection (OT)  transfer, OT goal 1  -SG        Dressing Goal Selection (OT)  dressing, OT goal 1  -SG           Bed Mobility Goal 1 (OT)    Activity/Assistive Device (Bed Mobility Goal 1, OT)  sit to supine/supine to sit  -SG        [place] Level/Cues Needed (Bed Mobility Goal 1, OT)  minimum assist (75% or more patient effort)  -SG        Time Frame (Bed Mobility Goal 1, OT)  1 week  -SG        Progress/Outcomes (Bed Mobility Goal 1, OT)  goal ongoing  -SG           Transfer Goal 1 (OT)    Activity/Assistive Device (Transfer Goal 1, OT)  toilet  -SG        [place] Level/Cues Needed (Transfer Goal 1, OT)  contact guard assist  -SG        Time Frame (Transfer Goal 1, OT)  1 week  -SG        Progress/Outcome (Transfer Goal 1, OT)  goal ongoing  -SG           Dressing Goal 1 (OT)    Activity/Assistive Device (Dressing Goal 1, OT)  dressing skills, all  -SG        [place]/Cues Needed (Dressing Goal 1, OT)  moderate assist (50-74% patient effort)  -SG        Time Frame (Dressing Goal 1, OT)  1 week  -SG        Progress/Outcome (Dressing Goal 1, OT)  goal ongoing  -SG          User Key  (r) = Recorded By, (t) = Taken By, (c) = Cosigned By    Initials Name Effective Dates    Nicolle Dorado, OTR 06/08/18 -     DELPHINE  Rona Ortega, TAMMY 06/16/16 -     Hari Peoples RN 06/16/16 -          Occupational Therapy Education     Title: PT OT SLP Therapies (In Progress)     Topic: Occupational Therapy (In Progress)     Point: ADL training (In Progress)     Description: Instruct learner(s) on proper safety adaptation and remediation techniques during self care or transfers.   Instruct in proper use of assistive devices.    Learning Progress Summary           Patient Acceptance, E,TB,D, NR by  at 11/4/2019  2:17 PM    Comment:  role of OT and POC. Safety for adl   Family Acceptance, E,TB,D, NR by  at 11/4/2019  2:17 PM    Comment:  role of OT and POC. Safety for adl                               User Key     Initials Effective Dates Name Provider Type Discipline     06/08/18 -  Nicolle Rosario OTR Occupational Therapist OT                  OT Recommendation and Plan  Planned Therapy Interventions (OT Eval): activity tolerance training, BADL retraining, transfer/mobility retraining  Therapy Frequency (OT Eval): 5 times/wk  Plan of Care Review  Plan of Care Reviewed With: patient  Plan of Care Reviewed With: patient  Outcome Summary: Pt presents to OT with decreased strength and endurance which limits independence and safety for functional transfers and adls. Pt performed transfer from commode to bed with min A and performed grooming tasks with SBA. Pt will continue to benefit from OT     Outcome Measures     Row Name 11/04/19 0856             How much help from another is currently needed...    Putting on and taking off regular lower body clothing?  2  -SG      Bathing (including washing, rinsing, and drying)  2  -SG      Toileting (which includes using toilet bed pan or urinal)  2  -SG      Putting on and taking off regular upper body clothing  3  -SG      Taking care of personal grooming (such as brushing teeth)  3  -SG      Eating meals  3  -SG      AM-PAC 6 Clicks Score (OT)  15  -SG         Functional Assessment     Outcome Measure Options  AM-PAC 6 Clicks Daily Activity (OT)  -SG        User Key  (r) = Recorded By, (t) = Taken By, (c) = Cosigned By    Initials Name Provider Type    Nicolle Dorado OTR Occupational Therapist          Time Calculation:   Time Calculation- OT     Row Name 11/04/19 1419 11/04/19 1052          Time Calculation- OT    OT Start Time  1347  -  --     OT Stop Time  1404  -  --     OT Time Calculation (min)  17 min  -  --     Total Timed Code Minutes- OT  9 minute(s)  -  --     OT Received On  11/04/19  -  --     OT - Next Appointment  --  11/05/19 -     OT Goal Re-Cert Due Date  11/11/19  -  --       User Key  (r) = Recorded By, (t) = Taken By, (c) = Cosigned By    Initials Name Provider Type    Nicolle Dorado OTR Occupational Therapist        Therapy Charges for Today     Code Description Service Date Service Provider Modifiers Qty    [id number]  OT EVAL LOW COMPLEXITY 2 11/4/2019 Nicolle Rosario OTR GO 1    [id number]  OT SELF CARE/MGMT/TRAIN EA 15 MIN 11/4/2019 Nicolle Rosario OTR GO 1               FLORINA Joseph  11/4/2019

## 2019-11-04 NOTE — PLAN OF CARE
"Problem: Skin Injury Risk (Adult)  Goal: Skin Health and Integrity  Outcome: Ongoing (interventions implemented as appropriate)   11/04/19 1553   Skin Injury Risk (Adult)   Skin Health and Integrity making progress toward outcome       Problem: Fall Risk (Adult)  Goal: Absence of Fall  Outcome: Ongoing (interventions implemented as appropriate)      Problem: Anemia (Adult)  Goal: Symptom Improvement  Outcome: Ongoing (interventions implemented as appropriate)      Problem: Patient Care Overview  Goal: Plan of Care Review  Outcome: Ongoing (interventions implemented as appropriate)   11/04/19 1553   Coping/Psychosocial   Plan of Care Reviewed With patient;daughter;son   Plan of Care Review   Progress declining   OTHER   Outcome Summary Pt up to BSC several times today for frequent urination, pt weak while ambulating assist x1, Percocet x1 for right shoulder pain. Continue current plan of care.     Goal: Individualization and Mutuality  Outcome: Ongoing (interventions implemented as appropriate)   10/29/19 0418 10/29/19 1550 11/02/19 1435   Individualization   Patient Specific Preferences Pt goes by \"Bonnie\" --  --    Patient Specific Goals (Include Timeframe) Comfort care --  --    Patient Specific Interventions --  --  Monitor for safety and symptom management   Mutuality/Individual Preferences   What Anxieties, Fears, Concerns, or Questions Do You Have About Your Care? None stated --  --    What Information Would Help Us Give You More Personalized Care? None stated --  --    How Would You and/or Your Support Person Like to Participate in Your Care? --  family involved --    Mutuality/Individual Preferences   How to Address Anxieties/Fears None stated --  --        Problem: Palliative Care (Adult)  Goal: Maximized Comfort  Outcome: Ongoing (interventions implemented as appropriate)    Goal: Enhanced Quality of Life  Outcome: Ongoing (interventions implemented as appropriate)      Problem: Pain, Acute (Adult)  Goal: " Acceptable Pain Control/Comfort Level  Outcome: Ongoing (interventions implemented as appropriate)

## 2019-11-04 NOTE — PROGRESS NOTES
"DAILY PROGRESS NOTE  [facility]    Patient Identification:  Name: Bonnie Mayberry  Age: 89 y.o.  Sex: female  :  1925  MRN: [id number]         Primary Care Physician: Patricio Maldonado MD    Subjective: patient is resting; appetite is improving a little; she remains weak  Interval History: follow up for breast cancer, malnutrition, weakness, anemia     Objective:    Scheduled Meds:  calcium-vitamin D 1,000 mg Oral BID   cetirizine 5 mg Oral Daily   furosemide 20 mg Oral Daily   levothyroxine 137 mcg Oral Q AM   pantoprazole 40 mg Oral BID AC   phosphorus 500 mg Oral BID   potassium chloride 20 mEq Oral BID With Meals   sodium chloride 10 mL Intravenous Q12H     Continuous Infusions:  sodium chloride 30 mL/hr Last Rate: Stopped (10/28/19 1939)       Vital signs in last 24 hours:  Temp:  [97.1 °F (36.2 °C)-98 °F (36.7 °C)] 97.1 °F (36.2 °C)  Heart Rate:  [68-73] 73  Resp:  [16] 16  BP: (130-132)/(63-65) 130/63    Intake/Output:    Intake/Output Summary (Last 24 hours) at 2019 1319  Last data filed at 2019 0641  Gross per 24 hour   Intake 60 ml   Output 40 ml   Net 20 ml       Exam:  /63 (BP Location: Left arm, Patient Position: Lying)   Pulse 73   Temp 97.1 °F (36.2 °C) (Oral)   Resp 16   Ht 160 cm (63\")   Wt 47.1 kg (103 lb 13.4 oz)   SpO2 95%   BMI 18.39 kg/m²   ; f  General Appearance:    Alert, cooperative, no distress, AAOx3; frail, thin, chronically ill-appearing                          Head:    Normocephalic, without obvious abnormality, atraumatic                           Eyes:    PERRL, conjunctiva/corneas clear, EOM's intact, both eyes                         Throat:   Lips, tongue, gums normal; oral mucosa pink and moist                           Neck:   Supple, symmetrical, trachea midline, no JVD                         Lungs:    Decreased breath sounds bilaterally, respirations unlabored                 Chest Wall:    No tenderness or deformity            "               Heart:    Regular rate and rhythm, S1 and S2 normal, no murmur,no  Rub                                      or gallop                  Abdomen:     Soft, non-tender, bowel sounds active, no masses, no                                                        organomegaly                  Extremities:   Extremities normal, atraumatic, no cyanosis or edema                        Pulses:   Pulses palpable in all extremities                            Skin:   Skin is warm and dry,  no rashes or palpable lesions                  Neurologic:   CNII-XII intact, motor strength grossly intact, sensation grossly                                         intact to light touch, no focal deficits noted       Data Review:  Labs in chart were reviewed.  Lab Results   Component Value Date    WBC 5.48 11/04/2019    HGB 10.0 (L) 11/04/2019    HCT 31.7 (L) 11/04/2019     11/04/2019     Lab Results   Component Value Date     11/04/2019    K 4.5 11/04/2019     11/04/2019    CO2 28.4 11/04/2019    BUN 7 (L) 11/04/2019    CREATININE 0.65 11/04/2019    GLUCOSE 83 11/04/2019     Lab Results   Component Value Date    CALCIUM 8.2 11/04/2019    MG 2.1 11/04/2019     No results found for: AST, ALT, ALKPHOS  Patient Active Problem List   Diagnosis Code   • Hypothyroidism E03.9   • Generalized osteoarthritis M15.9   • Urinary incontinence R32   • Dementia without behavioral disturbance (CMS/HCC) F03.90   • Anemia D64.9   • Hyponatremia E87.1   • Breast cancer (CMS/HCC) C50.919   • Hypokalemia E87.6   • PUD (peptic ulcer disease) K27.9   • DNR (do not resuscitate) Z66   • Acute pulmonary edema (CMS/[facility]) J81.0   • Malignant neoplasm of central portion of right female breast (CMS/[facility]) C50.111       Assessment:    Anemia    Hypothyroidism    Dementia without behavioral disturbance (CMS/HCC)    Hyponatremia    Breast cancer (CMS/[facility])    Hypokalemia    PUD (peptic ulcer disease)    DNR (do not resuscitate)    Acute pulmonary  edema (CMS/HCC)    Malignant neoplasm of central portion of right female breast (CMS/HCC)  underweight  weakness    Plan:  Will continue to monitor  Recheck labs in the am  Still waiting for path  Could consider hormonal therapy if hormone receptors are positive which will likely be the case  Expect she will need rehab prior to going home alone  Potassium is better  Medium risk  D.w son  Notes reviewed    Germaine Crouch MD  11/4/2019  1:19 PM

## 2019-11-04 NOTE — PLAN OF CARE
Problem: Skin Injury Risk (Adult)  Goal: Skin Health and Integrity  Outcome: Ongoing (interventions implemented as appropriate)      Problem: Anemia (Adult)  Goal: Symptom Improvement  Outcome: Ongoing (interventions implemented as appropriate)      Problem: Patient Care Overview  Goal: Plan of Care Review  Outcome: Ongoing (interventions implemented as appropriate)   11/04/19 0616   Coping/Psychosocial   Plan of Care Reviewed With patient   Plan of Care Review   Progress improving   OTHER   Outcome Summary No complaints. Rested well. VSS. Will monitor       Problem: Palliative Care (Adult)  Goal: Maximized Comfort  Outcome: Ongoing (interventions implemented as appropriate)    Goal: Enhanced Quality of Life  Outcome: Ongoing (interventions implemented as appropriate)      Problem: Pain, Acute (Adult)  Goal: Acceptable Pain Control/Comfort Level  Outcome: Ongoing (interventions implemented as appropriate)

## 2019-11-04 NOTE — PROGRESS NOTES
"DAILY PROGRESS NOTE    Patient Identification:  Name: Bonnie Mayberry  Age: 89 y.o.  Sex: female  :  1925  MRN: [id number]           Date:     2019     Follow up for neglected right breast cancer    Subjective:    Interval History: The patient has done fairly well over the last 24 hours.    Objective:    Scheduled Meds:  calcium-vitamin D 1,000 mg Oral BID   cetirizine 5 mg Oral Daily   furosemide 20 mg Oral Daily   levothyroxine 137 mcg Oral Q AM   pantoprazole 40 mg Oral BID AC   phosphorus 500 mg Oral BID   potassium chloride 20 mEq Oral BID With Meals   sodium chloride 10 mL Intravenous Q12H     Continuous Infusions:  sodium chloride 30 mL/hr Last Rate: Stopped (10/28/19 1939)     PRN Meds:•  acetaminophen **OR** acetaminophen **OR** acetaminophen  •  hydrALAZINE  •  magnesium sulfate **OR** magnesium sulfate **OR** magnesium sulfate  •  Morphine **AND** naloxone  •  nitroglycerin  •  ondansetron  •  ondansetron  •  oxyCODONE-acetaminophen  •  potassium chloride **OR** potassium chloride **OR** potassium chloride  •  promethazine **OR** promethazine **OR** promethazine **OR** promethazine  •  [COMPLETED] Insert peripheral IV **AND** sodium chloride  •  sodium chloride  •  sodium chloride    Vital signs in last 24 hours:  Temp:  [97.1 °F (36.2 °C)-98 °F (36.7 °C)] 97.1 °F (36.2 °C)  Heart Rate:  [68-73] 73  Resp:  [16] 16  BP: (130-132)/(63-65) 130/63    Intake/Output:    Intake/Output Summary (Last 24 hours) at 2019 0758  Last data filed at 2019 0641  Gross per 24 hour   Intake 180 ml   Output 40 ml   Net 140 ml       Physical Examination:   /63 (BP Location: Left arm, Patient Position: Lying)   Pulse 73   Temp 97.1 °F (36.2 °C) (Oral)   Resp 16   Ht 160 cm (63\")   Wt 47.1 kg (103 lb 13.4 oz)   SpO2 95%   BMI 18.39 kg/m²     She  is alert patient is in no distress but a little sleepy this morning.   Breast exam-the incision looks fine with no evidence of redness, hematoma " formation, or wound healing problems.    Extremity examination is unremarkable.  Speech normal.        Data Review:  All labs (24hrs):   Recent Results (from the past 24 hour(s))   Basic Metabolic Panel    Collection Time: 11/04/19  5:51 AM   Result Value Ref Range    Glucose 83 65 - 99 mg/dL    BUN 7 (L) 8 - 23 mg/dL    Creatinine 0.65 0.57 - 1.00 mg/dL    Sodium 138 136 - 145 mmol/L    Potassium 4.5 3.5 - 5.2 mmol/L    Chloride 103 98 - 107 mmol/L    CO2 28.4 22.0 - 29.0 mmol/L    Calcium 8.2 8.2 - 9.6 mg/dL    eGFR Non African Amer 85 >60 mL/min/1.73    BUN/Creatinine Ratio 10.8 7.0 - 25.0    Anion Gap 6.6 5.0 - 15.0 mmol/L   Magnesium    Collection Time: 11/04/19  5:51 AM   Result Value Ref Range    Magnesium 2.1 1.7 - 2.3 mg/dL   CBC (No Diff)    Collection Time: 11/04/19  5:52 AM   Result Value Ref Range    WBC 5.48 3.40 - 10.80 10*3/mm3    RBC 3.65 (L) 3.77 - 5.28 10*6/mm3    Hemoglobin 10.0 (L) 12.0 - 15.9 g/dL    Hematocrit 31.7 (L) 34.0 - 46.6 %    MCV 86.8 79.0 - 97.0 fL    MCH 27.4 26.6 - 33.0 pg    MCHC 31.5 31.5 - 35.7 g/dL    RDW 14.6 12.3 - 15.4 %    RDW-SD 46.3 37.0 - 54.0 fl    MPV 9.5 6.0 - 12.0 fL    Platelets 348 140 - 450 10*3/mm3           Assessment:    Anemia    Hypothyroidism    Dementia without behavioral disturbance (CMS/HCC)    Hyponatremia    Breast cancer (CMS/HCC)    Hypokalemia    PUD (peptic ulcer disease)    DNR (do not resuscitate)    Acute pulmonary edema (CMS/HCC)    Malignant neoplasm of central portion of right female breast (CMS/HCC)    The patient is progressing nicely from her mastectomy.  Her potassium and hemoglobin are stable    Plan:  Her drain is not quite ready to come out but otherwise the patient would be ready for discharge from my standpoint.  Discharge planning will see her today and work with the family as to whether the patient can go home with help or needs some rehab.  It appears that her pathology report is still pending.    Mart Wilson,  MD  11/4/2019

## 2019-11-04 NOTE — PLAN OF CARE
Problem: Patient Care Overview  Goal: Plan of Care Review  Outcome: Ongoing (interventions implemented as appropriate)   11/04/19 8606   Coping/Psychosocial   Plan of Care Reviewed With patient   OTHER   Outcome Summary Pt presents to OT with decreased strength and endurance which limits independence and safety for functional transfers and adls. Pt performed transfer from commode to bed with min A and performed grooming tasks with SBA. Pt will continue to benefit from OT

## 2019-11-05 NOTE — PROGRESS NOTES
Discharge Planning Assessment  [facility]     Patient Name: Bonnie Mayberry  MRN: [id number]  Today's Date: 11/5/2019    Admit Date: 10/24/2019    Discharge Needs Assessment    No documentation.       Discharge Plan     Row Name 11/05/19 1611       Plan    Plan Comments  Spoke with Nicolle DOE/[facility] and the discharge plan is for tomorrow skilled palliative with physical therapy and Dr. Vianney orozco. The family will provide the transportation to facility. NADER Guo Rn, CCP.         Destination - Selection Complete      Service Provider Request Status Selected Services Address Phone Number Fax Number    [facility] Selected Skilled Nursing [address] [address] [phone]    [facility] Pending - Request Sent N/A [address] [address] [phone]      Durable Medical Equipment      No service coordination in this encounter.      Dialysis/Infusion      No service coordination in this encounter.      Home Medical Care      No service coordination in this encounter.      Therapy      No service coordination in this encounter.      Community Resources      No service coordination in this encounter.          Demographic Summary    No documentation.       Functional Status    No documentation.       Psychosocial    No documentation.       Abuse/Neglect    No documentation.       Legal    No documentation.       Substance Abuse    No documentation.       Patient Forms    No documentation.           Pati Guo RN

## 2019-11-05 NOTE — PLAN OF CARE
Problem: Skin Injury Risk (Adult)  Goal: Skin Health and Integrity  Outcome: Ongoing (interventions implemented as appropriate)      Problem: Fall Risk (Adult)  Goal: Absence of Fall  Outcome: Ongoing (interventions implemented as appropriate)      Problem: Anemia (Adult)  Goal: Symptom Improvement  Outcome: Ongoing (interventions implemented as appropriate)      Problem: Patient Care Overview  Goal: Plan of Care Review  Outcome: Ongoing (interventions implemented as appropriate)   11/05/19 0437   Coping/Psychosocial   Plan of Care Reviewed With patient   Plan of Care Review   Progress no change   OTHER   Outcome Summary No complaints of pain. Appears calm and pleasant. Safety measures observed. Will monitor for needs       Problem: Palliative Care (Adult)  Goal: Maximized Comfort  Outcome: Ongoing (interventions implemented as appropriate)    Goal: Enhanced Quality of Life  Outcome: Ongoing (interventions implemented as appropriate)

## 2019-11-05 NOTE — PLAN OF CARE
Problem: Patient Care Overview  Goal: Plan of Care Review  Outcome: Ongoing (interventions implemented as appropriate)   11/05/19 1647   Coping/Psychosocial   Plan of Care Reviewed With patient   Plan of Care Review   Progress improving   OTHER   Outcome Summary Pt tolerated treatment fair this date. Slightly increased activity tolerance, though fatigued very quickly. Required several rest breaks. Min A required to transfer to BSC and back to bed. Performed several seated LE exercises on EOB. PT will continue to address functional mobility deficits as tolerated.

## 2019-11-05 NOTE — PROGRESS NOTES
Name: Bonnie Mayberry ADMIT: 10/24/2019   : 1925  PCP: Patricio Maldonado MD    MRN: [id number] LOS: 11 days   AGE/SEX: 89 y.o. female  ROOM: hospitals/1   Subjective   Right shoulder pain reported. She has history of injury to that shoulder previously and had some type of fracture which was not deemed to be operative candidate at the time. She had an additional fall after that with some worse pain. No CP SOA NVD reported. She is having frequent urination but no dysuria.    Objective   Vital Signs  Temp:  [97.3 °F (36.3 °C)-98.4 °F (36.9 °C)] 98.4 °F (36.9 °C)  Heart Rate:  [73-77] 77  Resp:  [16] 16  BP: (104-142)/(62-67) 104/62  SpO2:  [92 %-96 %] 92 %  on   ;   Device (Oxygen Therapy): room air  Body mass index is 18.39 kg/m².    Physical Exam   Constitutional: She appears well-developed. No distress.   frail   HENT:   Head: Atraumatic.   Nose: Nose normal.   Eyes: Conjunctivae and EOM are normal.   Neck: Neck supple. No tracheal deviation present.   Cardiovascular: Normal rate and regular rhythm.   Pulmonary/Chest: Effort normal. She has no wheezes.   Abdominal: Soft. She exhibits no distension. There is no tenderness.   Musculoskeletal: She exhibits tenderness (r shoulder, deformity present). She exhibits no edema.   Neurological: She is alert. No cranial nerve deficit.   Skin: Skin is warm and dry. She is not diaphoretic.   Chest dressed, drain in place   Psychiatric: She has a normal mood and affect.   Nursing note and vitals reviewed.      Results Review:       I reviewed the patient's new clinical results.     I reviewed imaging, agree with interpretation.      Results from last 7 days   Lab Units 19  0552 19  0545 19  0659 10/31/19  0539   WBC 10*3/mm3 5.48 6.01 9.34 17.56*   HEMOGLOBIN g/dL 10.0* 9.3* 9.6* 10.1*   PLATELETS 10*3/mm3 348 283 332 298     Results from last 7 days   Lab Units 19  0551 19  0601 19  0545 19  0659   SODIUM mmol/L 138 136 132*  130*   POTASSIUM mmol/L 4.5 3.1* 4.1 3.2*   CHLORIDE mmol/L 103 98 96* 92*   CO2 mmol/L 28.4 30.2* 28.1 30.6*   BUN mg/dL 7* 7* 7* 7*   CREATININE mg/dL 0.65 0.60 0.64 0.57   GLUCOSE mg/dL 83 89 70 84   Estimated Creatinine Clearance: 32.7 mL/min (by C-G formula based on SCr of 0.65 mg/dL).  Results from last 7 days   Lab Units 11/04/19  0551 11/03/19  0601 11/02/19  0545 11/01/19  0659 10/31/19  0539 10/30/19  0759   CALCIUM mg/dL 8.2 7.8* 7.8* 7.9* 7.6* 7.3*   MAGNESIUM mg/dL 2.1  --   --   --  2.0 1.8   PHOSPHORUS mg/dL  --   --   --   --  3.3 2.7         calcium-vitamin D 1,000 mg Oral BID   cetirizine 5 mg Oral Daily   furosemide 20 mg Oral Daily   levothyroxine 137 mcg Oral Q AM   pantoprazole 40 mg Oral BID AC   phosphorus 500 mg Oral BID   potassium chloride 20 mEq Oral BID With Meals   sodium chloride 10 mL Intravenous Q12H       sodium chloride 30 mL/hr Last Rate: Stopped (10/28/19 1939)   Diet Soft Texture; Ground      Assessment/Plan      Active Hospital Problems    Diagnosis  POA   • **Anemia [D64.9]  Yes   • Acute pulmonary edema (CMS/HCC) [J81.0]  No   • PUD (peptic ulcer disease) [K27.9]  Yes   • DNR (do not resuscitate) [Z66]  No   • Hypokalemia [E87.6]  Yes   • Hyponatremia [E87.1]  Yes   • Breast cancer (CMS/HCC) [C50.919]  Yes   • Malignant neoplasm of central portion of right female breast (CMS/HCC) [C50.111]  Unknown   • Dementia without behavioral disturbance (CMS/HCC) [F03.90]  Yes   • Hypothyroidism [E03.9]  Yes      Resolved Hospital Problems   No resolved problems to display.     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 Negative ER NM Her2 on 1 sample. + ER, -NM, equivocal her2 on 2nd. Breast surgery following.   · Anemia: Stable now  · Hyponatremia: Resolved  · Hypokalemia: Resolved  · Right shoulder pain: Old clavicle separation noted from previous orthopedic surgery clinic. She can follow up  with Dr Ferrari if needed. Continue pain control.  · Hypothyroid: Synthroid  · PUD: PPI  · Weakness: Continue PT  · Disposition: Possible SNF/CCP to discuss    Orestes Steele MD  [facility]  11/05/19  10:47 AM

## 2019-11-05 NOTE — THERAPY TREATMENT NOTE
Patient Name: Bonnie Mayberry  : 1925    MRN: [id number]                              Today's Date: 2019       Admit Date: 10/24/2019    Visit Dx:     ICD-10-CM ICD-9-CM   1. Anemia, unspecified type D64.9 285.9   2. Malignant neoplasm of lower-outer quadrant of right female breast, unspecified estrogen receptor status (CMS/HCC) C50.511 174.5   3. Malignant neoplasm of central portion of right female breast, unspecified estrogen receptor status (CMS/HCC) C50.111 174.1     Patient Active Problem List   Diagnosis   • Hypothyroidism   • Generalized osteoarthritis   • Urinary incontinence   • Dementia without behavioral disturbance (CMS/HCC)   • Anemia   • Hyponatremia   • Breast cancer (CMS/HCC)   • Hypokalemia   • PUD (peptic ulcer disease)   • DNR (do not resuscitate)   • Acute pulmonary edema (CMS/HCC)   • Malignant neoplasm of central portion of right female breast (CMS/HCC)     Past Medical History:   Diagnosis Date   • Arthritis    • Disease of thyroid gland      Past Surgical History:   Procedure Laterality Date   • ADENOIDECTOMY     • APPENDECTOMY     • ENDOSCOPY N/A 10/27/2019    Procedure: ESOPHAGOGASTRODUODENOSCOPY WITH BIOPSIES;  Surgeon: Mikael Rosario MD;  Location: [facility] ENDOSCOPY;  Service: Gastroenterology   • HYSTERECTOMY     • MASTECTOMY Right 10/30/2019    Procedure: BREAST MASTECTOMY;  Surgeon: Mart Wilson MD;  Location: [facility] OR;  Service: General   • TONSILLECTOMY       General Information     Row Name 19 1401          PT Evaluation Time/Intention    Document Type  therapy note (daily note)  -     Mode of Treatment  physical therapy  -     Row Name 19 140          General Information    Existing Precautions/Restrictions  fall  -     Row Name 19 1401          Cognitive Assessment/Intervention- PT/OT    Orientation Status (Cognition)  oriented to;person;place  -       User Key  (r) = Recorded By, (t) = Taken By, (c) = Cosigned By     Initials Name Provider Type    Argelia Peoples PTA Physical Therapy Assistant        Mobility     Row Name 11/05/19 1402          Bed Mobility Assessment/Treatment    Bed Mobility Assessment/Treatment  supine-sit;sit-supine  -     Supine-Sit [place] (Bed Mobility)  minimum assist (75% patient effort)  -     Sit-Supine [place] (Bed Mobility)  moderate assist (50% patient effort)  -     Assistive Device (Bed Mobility)  bed rails;head of bed elevated  -     Row Name 11/05/19 1402          Sit-Stand Transfer    Sit-Stand [place] (Transfers)  minimum assist (75% patient effort)  -     Assistive Device (Sit-Stand Transfers)  walker, front-wheeled  -     Row Name 11/05/19 1402          Gait/Stairs Assessment/Training    [place] Level (Gait)  minimum assist (75% patient effort)  -     Assistive Device (Gait)  walker, front-wheeled  -     Distance in Feet (Gait)  4  -     Pattern (Gait)  step-to  -SM     Deviations/Abnormal Patterns (Gait)  tasia decreased;stride length decreased  -     Bilateral Gait Deviations  forward flexed posture;heel strike decreased  -     Comment (Gait/Stairs)  very shaky and weak; limited d/t fatigue  -       User Key  (r) = Recorded By, (t) = Taken By, (c) = Cosigned By    Initials Name Provider Type    Argelia Peoples PTA Physical Therapy Assistant        Obj/Interventions     Row Name 11/05/19 1403          Therapeutic Exercise    Lower Extremity (Therapeutic Exercise)  LAQ (long arc quad), bilateral;marching while seated  -     Lower Extremity Range of Motion (Therapeutic Exercise)  hip abduction/adduction, bilateral;ankle dorsiflexion/plantar flexion, bilateral  -     Exercise Type (Therapeutic Exercise)  AROM (active range of motion)  -     Position (Therapeutic Exercise)  seated  -     Sets/Reps (Therapeutic Exercise)  10 reps  -     Row Name 11/05/19 1403          Static Sitting Balance    Level of [place]  (Unsupported Sitting, Static Balance)  contact guard assist;minimal assist, 75% patient effort  -     Sitting Position (Unsupported Sitting, Static Balance)  sitting on edge of bed  -     Time Able to Maintain Position (Unsupported Sitting, Static Balance)  more than 5 minutes  -     Comment (Unsupported Sitting, Static Balance)  occasionally leaning backwards  -[facility] Name 11/05/19 1403          Static Standing Balance    Level of [place] (Supported Standing, Static Balance)  minimal assist, 75% patient effort  -     Time Able to Maintain Position (Supported Standing, Static Balance)  45 to 60 seconds  -     Assistive Device Utilized (Supported Standing, Static Balance)  walker, rolling  -     Comment (Supported Standing, Static Balance)  shaky/fatigued; leaning backwards initially  -       User Key  (r) = Recorded By, (t) = Taken By, (c) = Cosigned By    Initials Name Provider Type    Argelia Peoples PTA Physical Therapy Assistant        Goals/Plan    No documentation.       Clinical Impression     [facility] Name 11/05/19 1407          Pain Assessment    Additional Documentation  Pain Scale: Numbers Pre/Post-Treatment (Group)  -[facility] Name 11/05/19 1407          Pain Scale: Numbers Pre/Post-Treatment    Pain Scale: Numbers, Pretreatment  2/10  -     Pain Scale: Numbers, Post-Treatment  4/10  -     Pain Location - Side  Right  -     Pain Location - Orientation  incisional  -     Pain Location  chest  -     Pain Intervention(s)  Repositioned;Ambulation/increased activity;Rest  -[facility] Name 11/05/19 1407          Positioning and Restraints    Pre-Treatment Position  in bed  -     Post Treatment Position  bed  -SM     In Bed  supine;call light within reach;encouraged to call for assist;exit alarm on;with family/caregiver  -       User Key  (r) = Recorded By, (t) = Taken By, (c) = Cosigned By    Initials Name Provider Type    Argelia Peoples PTA Physical Therapy  Assistant        Outcome Measures     Row Name 11/05/19 1410          How much help from another person do you currently need...    Turning from your back to your side while in flat bed without using bedrails?  3  -SM     Moving from lying on back to sitting on the side of a flat bed without bedrails?  3  -SM     Moving to and from a bed to a chair (including a wheelchair)?  2  -SM     Standing up from a chair using your arms (e.g., wheelchair, bedside chair)?  3  -SM     Climbing 3-5 steps with a railing?  1  -SM     To walk in hospital room?  2  -SM     AM-PAC 6 Clicks Score (PT)  14  -     Row Name 11/05/19 1410          Functional Assessment    Outcome Measure Options  AM-PAC 6 Clicks Basic Mobility (PT)  -       User Key  (r) = Recorded By, (t) = Taken By, (c) = Cosigned By    Initials Name Provider Type    Argelia Peoples, KIMBERLY Physical Therapy Assistant        Physical Therapy Education     Title: PT OT SLP Therapies (In Progress)     Topic: Physical Therapy (Done)     Point: Mobility training (Done)     Learning Progress Summary           Patient Acceptance, E,TB,D, VU,NR by  at 11/5/2019  2:08 PM    Acceptance, E,TB,D, VU,NR by LISBET at 11/3/2019  3:08 PM    Acceptance, E,D, NR by SINDHU at 10/29/2019  4:55 PM    Acceptance, E,TB,D, VU,NR by  at 10/28/2019  4:07 PM    Acceptance, E,D, NR by TAMIKA at 10/26/2019  4:58 PM    Acceptance, E, NR by ARLETTE at 10/25/2019  9:21 AM   Family Acceptance, E,TB,D, VU,NR by LISBET at 11/3/2019  3:08 PM                   Point: Body mechanics (Done)     Learning Progress Summary           Patient Acceptance, E,TB,D, VU,NR by  at 11/5/2019  2:08 PM    Acceptance, E,D, NR by SINDHU at 10/29/2019  4:55 PM    Acceptance, E,TB,D, VU,NR by  at 10/28/2019  4:07 PM    Acceptance, E,D, NR by TAMIKA at 10/26/2019  4:58 PM    Acceptance, E, NR by ARLETTE at 10/25/2019  9:21 AM                   Point: Precautions (Done)     Learning Progress Summary           Patient Acceptance, E,TB,D, VU,NR by   at 11/5/2019  2:08 PM    Acceptance, E,D, NR by  at 10/29/2019  4:55 PM    Acceptance, E,TB,D, VU,NR by  at 10/28/2019  4:07 PM    Acceptance, E,D, NR by  at 10/26/2019  4:58 PM    Acceptance, E, NR by  at 10/25/2019  9:21 AM                               User Key     Initials Effective Dates Name Provider Type Discipline    PC 04/03/18 -  Xi Ferraro, PT Physical Therapist PT     04/03/18 -  Thu Montgomery, PT Physical Therapist PT     03/07/18 -  Argelia Phillips PTA Physical Therapy Assistant PT    MW 09/17/19 -  Heather Levy, PT Physical Therapist PT              PT Recommendation and Plan     Outcome Summary/Treatment Plan (PT)  Anticipated Discharge Disposition (PT): skilled nursing facility  Plan of Care Reviewed With: patient  Progress: improving  Outcome Summary: Pt tolerated treatment fair this date. Slightly increased activity tolerance, though fatigued very quickly. Required several rest breaks. Min A required to transfer to BSC and back to bed. Performed several seated LE exercises on EOB. PT will continue to address functional mobility deficits as tolerated.     Time Calculation:   PT Charges     Row Name 11/05/19 1411             Time Calculation    Start Time  1338  -      Stop Time  1402  -      Time Calculation (min)  24 min  -      PT Received On  11/05/19  -      PT - Next Appointment  11/06/19  -        User Key  (r) = Recorded By, (t) = Taken By, (c) = Cosigned By    Initials Name Provider Type     Argelia Phillips PTA Physical Therapy Assistant        Therapy Charges for Today     Code Description Service Date Service Provider Modifiers Qty    77671484548 HC PT THER PROC EA 15 MIN 11/5/2019 Argelia Phillips PTA GP 1    67795920477 HC PT THERAPEUTIC ACT EA 15 MIN 11/5/2019 Argelia Phillips PTA GP 1          PT G-Codes  Outcome Measure Options: AM-PAC 6 Clicks Basic Mobility (PT)  AM-PAC 6 Clicks Score (PT): 14  AM-PAC 6 Clicks Score (OT):  15    Argelia Phillips, PTA  11/5/2019

## 2019-11-05 NOTE — THERAPY TREATMENT NOTE
Acute Care - Occupational Therapy Treatment Note  [facility]     Patient Name: Bonnie Mayberry  : 1925  MRN: [id number]  Today's Date: 2019             Admit Date: 10/24/2019       ICD-10-CM ICD-9-CM   1. Anemia, unspecified type D64.9 285.9   2. Malignant neoplasm of lower-outer quadrant of right female breast, unspecified estrogen receptor status (CMS/HCC) C50.511 174.5   3. Malignant neoplasm of central portion of right female breast, unspecified estrogen receptor status (CMS/HCC) C50.111 174.1     Patient Active Problem List   Diagnosis   • Hypothyroidism   • Generalized osteoarthritis   • Urinary incontinence   • Dementia without behavioral disturbance (CMS/HCC)   • Anemia   • Hyponatremia   • Breast cancer (CMS/HCC)   • Hypokalemia   • PUD (peptic ulcer disease)   • DNR (do not resuscitate)   • Acute pulmonary edema (CMS/HCC)   • Malignant neoplasm of central portion of right female breast (CMS/HCC)     Past Medical History:   Diagnosis Date   • Arthritis    • Disease of thyroid gland      Past Surgical History:   Procedure Laterality Date   • ADENOIDECTOMY     • APPENDECTOMY     • ENDOSCOPY N/A 10/27/2019    Procedure: ESOPHAGOGASTRODUODENOSCOPY WITH BIOPSIES;  Surgeon: Mikael Rosario MD;  Location: [facility] ENDOSCOPY;  Service: Gastroenterology   • HYSTERECTOMY     • MASTECTOMY Right 10/30/2019    Procedure: BREAST MASTECTOMY;  Surgeon: Mart Wilson MD;  Location: [facility] MAIN OR;  Service: General   • TONSILLECTOMY         Therapy Treatment    Rehabilitation Treatment Summary     Row Name 19 1116             Treatment Time/Intention    Discipline  occupational therapist  -SG      Document Type  therapy note (daily note)  -SG      Subjective Information  no complaints  -SG      Mode of Treatment  individual therapy;occupational therapy  -SG      Patient/Family Observations  Pt sitting up on BSC  -SG      Patient Effort  good  -SG      Existing Precautions/Restrictions   fall  -SG      Recorded by [SG] Marisa Villalobos OTR 11/05/19 1118      Row Name 11/05/19 1116             Vital Signs    O2 Delivery Pre Treatment  room air  -SG      Recorded by [SG] Marisa Villalobos OTR 11/05/19 1118      Row Name 11/05/19 1116             Cognitive Assessment/Intervention- PT/OT    Orientation Status (Cognition)  oriented x 3  -SG      Recorded by [SG] Marisa Villalobos OTR 11/05/19 1118      Row Name 11/05/19 1116             Bed Mobility Assessment/Treatment    Supine-Sit [place] (Bed Mobility)  not tested  -SG      Sit-Supine [place] (Bed Mobility)  moderate assist (50% patient effort);verbal cues;nonverbal cues (demo/gesture)  -SG      Recorded by [SG] Marisa Villalobos OTR 11/05/19 1118      Row Name 11/05/19 1116             Transfer Assessment/Treatment    Transfer Assessment/Treatment  sit-stand transfer;stand-sit transfer;toilet transfer  -SG      Recorded by [SG] Marisa Villalobos OTR 11/05/19 1118      Row Name 11/05/19 1116             Sit-Stand Transfer    Sit-Stand [place] (Transfers)  minimum assist (75% patient effort);moderate assist (50% patient effort);verbal cues  -SG      Recorded by [SG] Marisa Villalobos OTR 11/05/19 1118      Row Name 11/05/19 1116             Stand-Sit Transfer    Stand-Sit [place] (Transfers)  minimum assist (75% patient effort);verbal cues  -SG      Recorded by [SG] Marisa Villalobos OTR 11/05/19 1118      Row Name 11/05/19 1116             Toilet Transfer    Type (Toilet Transfer)  stand pivot/stand step  -SG      [place] Level (Toilet Transfer)  minimum assist (75% patient effort);verbal cues;nonverbal cues (demo/gesture)  -SG      Assistive Device (Toilet Transfer)  commode, bedside without drop arms  -SG      Recorded by [SG] Marisa Villalobos OTR 11/05/19 1118      Row Name 11/05/19 1116             Toileting Assessment/Training    [place] Level (Toileting)  toileting skills;adjust/manage clothing;perform perineal  hygiene;maximum assist (25% patient effort)  -SG      Assistive Devices (Toileting)  commode, bedside without drop arms  -SG      Toileting Position  supported sitting  -SG      Recorded by [SG] Marisa Villalobos OTR 11/05/19 1118      Row Name 11/05/19 1116             Motor Skills Assessment/Interventions    Additional Documentation  Therapeutic Exercise (Group);Balance (Group)  -SG      Recorded by [SG] Marisa Villalobos OTR 11/05/19 1118      Row Name 11/05/19 1116             Therapeutic Exercise    Therapeutic Exercise  seated, upper extremities  -SG      Additional Documentation  Therapeutic Exercise (Row)  -SG      Recorded by [SG] Marisa Villalobos OTR 11/05/19 1118      Row Name 11/05/19 1116             Upper Extremity Seated Therapeutic Exercise    Performed, Seated Upper Extremity (Therapeutic Exercise)  shoulder flexion/extension;shoulder horizontal abduction/adduction;scapular protraction/retraction;elbow flexion/extension  -SG      Exercise Type, Seated Upper Extremity (Therapeutic Exercise)  AAROM (active assistive range of motion)  -SG      Expected Outcomes, Seated Upper Extremity (Therapeutic Exercise)  improve functional tolerance, single extremity activity;improve functional tolerance, self-care activity;improve functional tolerance, household activity;improve performance, reaching above shoulder level;improve performance, reaching for objects  -SG      Sets/Reps Detail, Seated Upper Extremity (Therapeutic Exercise)  1/10  -SG      Recorded by [SG] Marisa Villalobos OTR 11/05/19 1118      Row Name 11/05/19 1116             Balance    Balance  static sitting balance  -SG      Recorded by [SG] Marisa Villalobos OTR 11/05/19 1118      Row Name 11/05/19 1116             Static Sitting Balance    Level of [place] (Unsupported Sitting, Static Balance)  minimal assist, 75% patient effort;contact guard assist  -SG      Sitting Position (Unsupported Sitting, Static Balance)  sitting on edge of bed   -SG      Time Able to Maintain Position (Unsupported Sitting, Static Balance)  4 to 5 minutes  -SG      Comment (Unsupported Sitting, Static Balance)  Some posterior lean with fatigue  -SG      Recorded by [SG] Marisa Villalobos OTR 11/05/19 1118      Row Name 11/05/19 1116             Positioning and Restraints    Pre-Treatment Position  bedside commode  -SG      Post Treatment Position  bed  -SG      In Bed  supine;call light within reach;encouraged to call for assist;exit alarm on;with nsg  -SG      Recorded by [SG] Marisa Villalobos OTR 11/05/19 1118      Row Name 11/05/19 1116             Pain Scale: Numbers Pre/Post-Treatment    Pain Scale: Numbers, Pretreatment  0/10 - no pain  -SG      Recorded by [SG] Marisa Villalobos OTR 11/05/19 1118      Row Name                Wound 10/25/19 0315 Left coccyx Pressure Injury    Wound - Properties Group Date first assessed: 10/25/19 [JG] Time first assessed: 0315 [JG] Present on Hospital Admission: Y [JG] Side: Left [JG] Location: coccyx [JG] Primary Wound Type: Pressure inj [JG] Stage, Pressure Injury: Stage 1 [JG] Recorded by:  [JG] Rona Ortega RN 10/25/19 0320    Row Name                Wound 10/25/19 0330 Right breast Soft Tissue Necrosis    Wound - Properties Group Date first assessed: 10/25/19 [JG] Time first assessed: 0330 [JG] Present on Hospital Admission: Y [JG] Side: Right [JG] Location: breast [JG] Primary Wound Type: Soft tissue [JG] Stage, Pressure Injury: other (see comments) [JG], fungagting, odorous.  Recorded by:  [JG] Rona Ortega RN 10/25/19 0820    Row Name                Wound 10/30/19 1712 Right breast Incision    Wound - Properties Group Date first assessed: 10/30/19 [SM] Time first assessed: 1712 [SM] Side: Right [SM] Location: breast [SM] Primary Wound Type: Incision [SM] Recorded by:  [SM] Hari Phillips RN 10/30/19 1712      User Key  (r) = Recorded By, (t) = Taken By, (c) = Cosigned By    Initials Name Effective Dates  Discipline     Marisa Villalobos OTR 12/26/18 -  OT    Rona Guthrie RN 06/16/16 -  Nurse    Hari Peoples RN 06/16/16 -  Nurse        Wound 10/25/19 0315 Left coccyx Pressure Injury (Active)   Dressing Appearance dry;intact 11/4/2019  7:29 PM   Closure [place] 11/4/2019  7:29 PM   Base dressing in place, unable to visualize 11/4/2019  7:29 PM       Wound 10/30/19 1712 Right breast Incision (Active)   Dressing Appearance open to air 11/4/2019  7:29 PM   Closure Staples 11/4/2019  7:29 PM   Base clean;dry 11/4/2019  7:29 PM       Occupational Therapy Education     Title: PT OT SLP Therapies (In Progress)     Topic: Occupational Therapy (In Progress)     Point: ADL training (In Progress)     Description: Instruct learner(s) on proper safety adaptation and remediation techniques during self care or transfers.   Instruct in proper use of assistive devices.    Learning Progress Summary           Patient Acceptance, E,TB,D, NR by  at 11/4/2019  2:17 PM    Comment:  role of OT and POC. Safety for adl   Family Acceptance, E,TB,D, NR by  at 11/4/2019  2:17 PM    Comment:  role of OT and POC. Safety for adl                               User Key     Initials Effective Dates Name Provider Type Discipline     06/08/18 -  Nicolle Rosario OTR Occupational Therapist OT                OT Recommendation and Plan     Plan of Care Review  Plan of Care Reviewed With: patient  Plan of Care Reviewed With: patient  Outcome Summary: Pt very weak, tolerates UE ther ex at EOB but shows some decline in sitting balance with fatigue following toileting task.  Outcome Measures     Row Name 11/05/19 1100 11/04/19 1416          How much help from another is currently needed...    Putting on and taking off regular lower body clothing?  2  -SG  2  -SGA     Bathing (including washing, rinsing, and drying)  2  -SG  2  -SGA     Toileting (which includes using toilet bed pan or urinal)  2  -SG  2  -SGA     Putting on and taking off  regular upper body clothing  3  -SG  3  -SGA     Taking care of personal grooming (such as brushing teeth)  3  -SG  3  -SGA     Eating meals  3  -SG  3  -SGA     AM-PAC 6 Clicks Score (OT)  15  -SG  15  -SGA        Functional Assessment    Outcome Measure Options  AM-PAC 6 Clicks Daily Activity (OT)  -SG  AM-PAC 6 Clicks Daily Activity (OT)  -[facility]       User Key  (r) = Recorded By, (t) = Taken By, (c) = Cosigned By    Initials Name Provider Type    [facility] Nicolle Rosario OTR Occupational Therapist    Marisa Geronimo OTR Occupational Therapist           Time Calculation:   Time Calculation- OT     Row Name 11/05/19 1119             Time Calculation- OT    OT Start Time  1100  -      OT Stop Time  1112  -      OT Time Calculation (min)  12 min  -      OT Received On  11/05/19  -        User Key  (r) = Recorded By, (t) = Taken By, (c) = Cosigned By    Initials Name Provider Type     Marisa Villalobos OTR Occupational Therapist        Therapy Charges for Today     Code Description Service Date Service Provider Modifiers Qty    [id number]  OT SELF CARE/MGMT/TRAIN EA 15 MIN 11/5/2019 Marisa Villalobos OTR GO 1               FLORINA Rodas  11/5/2019

## 2019-11-05 NOTE — PROGRESS NOTES
"DAILY PROGRESS NOTE    Patient Identification:  Name: Bonnie Mayberry  Age: 89 y.o.  Sex: female  :  1925  MRN: [id number]           Date:     2019     Follow up for neglected right breast cancer    Subjective:    Interval History: The patient continues to have some trouble holding onto her urine.  The drain is in place and putting out small amounts of fluid.    Objective:    Scheduled Meds:  calcium-vitamin D 1,000 mg Oral BID   cetirizine 5 mg Oral Daily   furosemide 20 mg Oral Daily   levothyroxine 137 mcg Oral Q AM   pantoprazole 40 mg Oral BID AC   phosphorus 500 mg Oral BID   potassium chloride 20 mEq Oral BID With Meals   sodium chloride 10 mL Intravenous Q12H     Continuous Infusions:  sodium chloride 30 mL/hr Last Rate: Stopped (10/28/19 1939)     PRN Meds:•  acetaminophen **OR** acetaminophen **OR** acetaminophen  •  hydrALAZINE  •  magnesium sulfate **OR** magnesium sulfate **OR** magnesium sulfate  •  Morphine **AND** naloxone  •  nitroglycerin  •  ondansetron  •  ondansetron  •  oxyCODONE-acetaminophen  •  potassium chloride **OR** potassium chloride **OR** potassium chloride  •  promethazine **OR** promethazine **OR** promethazine **OR** promethazine  •  senna-docusate sodium  •  [COMPLETED] Insert peripheral IV **AND** sodium chloride  •  sodium chloride  •  sodium chloride    Vital signs in last 24 hours:  Temp:  [97.3 °F (36.3 °C)-98.4 °F (36.9 °C)] 98.4 °F (36.9 °C)  Heart Rate:  [73-77] 77  Resp:  [16] 16  BP: (104-142)/(62-67) 104/62    Intake/Output:    Intake/Output Summary (Last 24 hours) at 2019 1550  Last data filed at 2019 0510  Gross per 24 hour   Intake 0 ml   Output 15 ml   Net -15 ml       Physical Examination:   /62 (BP Location: Left arm, Patient Position: Lying)   Pulse 77   Temp 98.4 °F (36.9 °C) (Oral)   Resp 16   Ht 160 cm (63\")   Wt 47.1 kg (103 lb 13.4 oz)   SpO2 92%   BMI 18.39 kg/m²     She  is alert patient is in no distress.   Breast " exam-the right chest wall has a healing incision with staples in place.  There is a little bit of redness around the staples.  The drain is in place putting out mostly serous fluid.  The last 24 hours it put out only 15 cc.    Extremity examination is unremarkable.  Speech  normal.        Data Review:  All labs (24hrs): No results found for this or any previous visit (from the past 24 hour(s)).    Pathology report-I spoke with the pathologist.  The tumor in the breast appears to be sarcomatous but she did have 2+ lymph nodes which appeared to have more traditional ductal cancer in them.  A second opinion is being obtained.    Assessment:    Anemia    Hypothyroidism    Dementia without behavioral disturbance (CMS/HCC)    Hyponatremia    Breast cancer (CMS/HCC)    Hypokalemia    PUD (peptic ulcer disease)    DNR (do not resuscitate)    Acute pulmonary edema (CMS/HCC)    Malignant neoplasm of central portion of right female breast (CMS/HCC)    She seems to be recovering fairly well from her mastectomy.    Plan:  From my standpoint she could go to rehab anytime.  I will check her again tomorrow but I could likely remove the drain before she goes.    Mart Wilson MD  11/5/2019

## 2019-11-05 NOTE — PLAN OF CARE
Problem: Patient Care Overview  Goal: Plan of Care Review   11/05/19 1118   Coping/Psychosocial   Plan of Care Reviewed With patient   Plan of Care Review   Progress no change   OTHER   Outcome Summary Pt very weak, tolerates UE ther ex at EOB but shows some decline in sitting balance with fatigue following toileting task.

## 2019-11-05 NOTE — DISCHARGE PLACEMENT REQUEST
"Bonnie Mayberry V (89 y.o. Female)     Date of Birth Social Security Number Address Home Phone MRN    12/16/1925  [address] [address] [phone] [id number]    Anab[employer] Marital Status          None Single       Admission Date Admission Type Admitting Provider Attending Provider Department, Room/Bed    10/24/19 Emergency Orestes Steele MD Baumann, Patrick D, MD [address], P480/1    Discharge Date Discharge Disposition Discharge Destination                       Attending Provider:  Orestes Steele MD    Allergies:  Penicillins, Sulfa Antibiotics    Isolation:  None   Infection:  None   Code Status:  No CPR    Ht:  160 cm (63\")   Wt:  47.1 kg (103 lb 13.4 oz)    Admission Cmt:  None   Principal Problem:  Anemia [D64.9]                 Active Insurance as of 10/24/2019     Primary Coverage     Payor Plan Insurance Group Employer/Plan Group    MEDICARE MEDICARE A & B      Payor Plan Address Payor Plan Phone Number Payor Plan Fax Number Effective Dates    [address] [phone]  12/1/1990 - None Entered    [facility] [address]       Subscriber Name Subscriber Birth Date Member ID       BONNIE MAYBERRY V 12/16/1925 [id number]           Secondary Coverage     Payor Plan Insurance Group Employer/Plan Group    [facility] SUPP [id number]     Payor Plan Address Payor Plan Phone Number Payor Plan Fax Number Effective Dates    [address]   12/1/2016 - None Entered    [facility] [id number]       Subscriber Name Subscriber Birth Date Member ID       BONNIE MAYBERRY V 12/16/1925 [id number]                 Emergency Contacts      (Rel.) Home Phone Work Phone Mobile Phone    Augusta Ibarra (Daughter) [phone] -- [phone]    Ari Mayberry (Son) -- -- [phone]    Lanette Bhakta (Grandchild) [phone] -- --              "

## 2019-11-05 NOTE — PROGRESS NOTES
Discharge Planning Assessment  [facility]     Patient Name: Bonnie Mayberry  MRN: [id number]  Today's Date: 11/5/2019    Admit Date: 10/24/2019    Discharge Needs Assessment    No documentation.       Discharge Plan     Row Name 11/05/19 1244       Plan    Plan Comments  Spoke with the patient, son/Ari, Augusta/daughter and Augusta's spouse/Ed regarding all discharge options. At this time they would like for [facility] and [facility] to evaluate for a skilled bed for rehab and have Dr. Faith follow for Skilled palliative if the patient is not able to tolerate therapy. NADER Guo RN, CCP.         Destination      Service Provider Request Status Selected Services Address Phone Number Fax Number    [facility] [address] [facility] [address] [phone]    [facility] Pending - Request Sent N/A [address] [facility] [address] [phone]      Durable Medical Equipment      No service coordination in this encounter.      Dialysis/Infusion      No service coordination in this encounter.      Home Medical Care      No service coordination in this encounter.      Therapy      No service coordination in this encounter.      Community Resources      No service coordination in this encounter.          Demographic Summary    No documentation.       Functional Status    No documentation.       Psychosocial    No documentation.       Abuse/Neglect    No documentation.       Legal    No documentation.       Substance Abuse    No documentation.       Patient Forms    No documentation.           Pati Guo RN

## 2019-11-06 NOTE — PROGRESS NOTES
"DAILY PROGRESS NOTE    Patient Identification:  Name: Bonnie Mayberry  Age: 89 y.o.  Sex: female  :  1925  MRN: [id number]           Date:     2019     Follow up for treatment of neglected right breast cancer    Subjective:    Interval History: She has continued to improve.  Her drain is now putting out very small amounts of serosanguineous fluid.    Objective:    Scheduled Meds:  calcium-vitamin D 1,000 mg Oral BID   cetirizine 5 mg Oral Daily   furosemide 20 mg Oral Daily   levothyroxine 137 mcg Oral Q AM   pantoprazole 40 mg Oral BID AC   phosphorus 500 mg Oral BID   potassium chloride 20 mEq Oral BID With Meals   sodium chloride 10 mL Intravenous Q12H     Continuous Infusions:  sodium chloride 30 mL/hr Last Rate: Stopped (10/28/19 1939)     PRN Meds:•  acetaminophen **OR** acetaminophen **OR** acetaminophen  •  hydrALAZINE  •  magnesium sulfate **OR** magnesium sulfate **OR** magnesium sulfate  •  Morphine **AND** naloxone  •  nitroglycerin  •  ondansetron  •  ondansetron  •  oxyCODONE-acetaminophen  •  potassium chloride **OR** potassium chloride **OR** potassium chloride  •  promethazine **OR** promethazine **OR** promethazine **OR** promethazine  •  senna-docusate sodium  •  [COMPLETED] Insert peripheral IV **AND** sodium chloride  •  sodium chloride  •  sodium chloride    Vital signs in last 24 hours:  Temp:  [97.1 °F (36.2 °C)-97.4 °F (36.3 °C)] 97.4 °F (36.3 °C)  Heart Rate:  [83-85] 85  Resp:  [16] 16  BP: (129-131)/(65-82) 129/82    Intake/Output:    Intake/Output Summary (Last 24 hours) at 2019 1815  Last data filed at 2019 1803  Gross per 24 hour   Intake 690 ml   Output 610 ml   Net 80 ml       Physical Examination:   /82 (BP Location: Right leg, Patient Position: Lying)   Pulse 85   Temp 97.4 °F (36.3 °C) (Oral)   Resp 16   Ht 160 cm (63\")   Wt 47.1 kg (103 lb 13.4 oz)   SpO2 96%   BMI 18.39 kg/m²     She  is alert patient is in no distress.   Breast exam-the " patient still has staples in place but the incision itself looks fine and the drain has been removed.    Extremity examination is unremarkable.  Speech  normal.        Data Review:  All labs (24hrs):   Recent Results (from the past 24 hour(s))   Basic Metabolic Panel    Collection Time: 11/06/19  5:58 AM   Result Value Ref Range    Glucose 82 65 - 99 mg/dL    BUN 13 8 - 23 mg/dL    Creatinine 0.77 0.57 - 1.00 mg/dL    Sodium 138 136 - 145 mmol/L    Potassium 4.9 3.5 - 5.2 mmol/L    Chloride 99 98 - 107 mmol/L    CO2 35.6 (H) 22.0 - 29.0 mmol/L    Calcium 8.3 8.2 - 9.6 mg/dL    eGFR Non African Amer 70 >60 mL/min/1.73    BUN/Creatinine Ratio 16.9 7.0 - 25.0    Anion Gap 3.4 (L) 5.0 - 15.0 mmol/L   Phosphorus    Collection Time: 11/06/19  5:58 AM   Result Value Ref Range    Phosphorus 4.7 (H) 2.5 - 4.5 mg/dL            Assessment:    Anemia    Hypothyroidism    Dementia without behavioral disturbance (CMS/HCC)    Hyponatremia    Breast cancer (CMS/HCC)    Hypokalemia    PUD (peptic ulcer disease)    DNR (do not resuscitate)    Acute pulmonary edema (CMS/HCC)    Malignant neoplasm of central portion of right female breast (CMS/HCC)    Doing well following her right mastectomy.  The path reveals a sarcomatous type lesion and a second opinion is being obtained.  Given the patient's advanced age this will not impact any further treatment.  The drain is been removed.    Plan:  The patient will be heading to the [facility] tomorrow and I am okay with her doing that.  She does have staples in place and they probably need to remain in place another 7 to 10 days.  Hopefully those can be removed at the nursing home but if not I would need to see her in the office.    Mart Wilson MD  11/6/2019

## 2019-11-06 NOTE — THERAPY TREATMENT NOTE
Acute Care - Occupational Therapy Treatment Note  [facility]     Patient Name: Bonnie Mayberry  : 1925  MRN: [id number]  Today's Date: 2019             Admit Date: 10/24/2019       ICD-10-CM ICD-9-CM   1. Anemia, unspecified type D64.9 285.9   2. Malignant neoplasm of lower-outer quadrant of right female breast, unspecified estrogen receptor status (CMS/HCC) C50.511 174.5   3. Malignant neoplasm of central portion of right female breast, unspecified estrogen receptor status (CMS/HCC) C50.111 174.1     Patient Active Problem List   Diagnosis   • Hypothyroidism   • Generalized osteoarthritis   • Urinary incontinence   • Dementia without behavioral disturbance (CMS/HCC)   • Anemia   • Hyponatremia   • Breast cancer (CMS/HCC)   • Hypokalemia   • PUD (peptic ulcer disease)   • DNR (do not resuscitate)   • Acute pulmonary edema (CMS/HCC)   • Malignant neoplasm of central portion of right female breast (CMS/HCC)     Past Medical History:   Diagnosis Date   • Arthritis    • Disease of thyroid gland      Past Surgical History:   Procedure Laterality Date   • ADENOIDECTOMY     • APPENDECTOMY     • ENDOSCOPY N/A 10/27/2019    Procedure: ESOPHAGOGASTRODUODENOSCOPY WITH BIOPSIES;  Surgeon: Mikael Rosario MD;  Location: [facility] ENDOSCOPY;  Service: Gastroenterology   • HYSTERECTOMY     • MASTECTOMY Right 10/30/2019    Procedure: BREAST MASTECTOMY;  Surgeon: Mart Wilson MD;  Location: [facility] MAIN OR;  Service: General   • TONSILLECTOMY         Therapy Treatment    Rehabilitation Treatment Summary     Row Name 19 1422             Treatment Time/Intention    Discipline  occupational therapist  -AF      Document Type  therapy note (daily note)  -AF      Subjective Information  complains of;fatigue  -AF      Mode of Treatment  occupational therapy  -AF      Patient/Family Observations  supine in bed wtih daughter present, with encouragement partiicpate din EOB exs  -AF      Patient Effort  good   -AF      Existing Precautions/Restrictions  fall  -AF      Recorded by [AF] Verona Chavez, OTR 11/06/19 1425      Row Name 11/06/19 1422             Cognitive Assessment/Intervention- PT/OT    Orientation Status (Cognition)  oriented x 3  -AF      Cognitive Assessment/Intervention Comment  states that her daughter helps with her history  -AF      Recorded by [AF] Verona Chavez, OTR 11/06/19 1425      Row Name 11/06/19 1422             Bed Mobility Assessment/Treatment    Supine-Sit [place] (Bed Mobility)  minimum assist (75% patient effort);verbal cues  -AF      Sit-Supine [place] (Bed Mobility)  minimum assist (75% patient effort);verbal cues  -AF      Assistive Device (Bed Mobility)  bed rails;head of bed elevated  -AF      Recorded by [AF] Verona Chavez, OTR 11/06/19 1425      Row Name 11/06/19 1422             Transfer Assessment/Treatment    Comment (Transfers)  sit to stand to take a few steps to the HOB MIN  -AF      Recorded by [AF] Verona Chavez, OTR 11/06/19 1425      Row Name 11/06/19 1422             Grooming Assessment/Training    Murray Level (Grooming)  grooming skills;minimum assist (75% patient effort)  -AF      Grooming Position  edge of bed sitting  -AF      Recorded by [AF] Verona Chavez, OTR 11/06/19 1425      Row Name 11/06/19 1422             Upper Extremity Seated Therapeutic Exercise    Performed, Seated Upper Extremity (Therapeutic Exercise)  scapular protraction/retraction;shoulder flexion/extension;elbow flexion/extension;shoulder horizontal abduction/adduction  -AF      Exercise Type, Seated Upper Extremity (Therapeutic Exercise)  AROM (active range of motion)  -AF      Expected Outcomes, Seated Upper Extremity (Therapeutic Exercise)  improve functional tolerance, self-care activity  -AF      Sets/Reps Detail, Seated Upper Extremity (Therapeutic Exercise)  2/10  -AF      Recorded by [AF] Verona Chavez OTR 11/06/19 1425      Row Name 11/06/19 1422              Static Sitting Balance    Level of [place] (Unsupported Sitting, Static Balance)  contact guard assist  -AF      Sitting Position (Unsupported Sitting, Static Balance)  sitting on edge of bed  -AF      Time Able to Maintain Position (Unsupported Sitting, Static Balance)  4 to 5 minutes  -AF      Comment (Unsupported Sitting, Static Balance)  vc's and tc's  -AF      Recorded by [AF] Verona Chavez, OTR 11/06/19 1425      Row Name 11/06/19 1422             Positioning and Restraints    Pre-Treatment Position  in bed  -AF      Post Treatment Position  bed  -AF      In Bed  fowlers;call light within reach;encouraged to call for assist;exit alarm on;with family/caregiver daugthter present  -AF      Recorded by [AF] Verona Chavez, OTR 11/06/19 1425      Row Name                Wound 10/25/19 0315 Left coccyx Pressure Injury    Wound - Properties Group Date first assessed: 10/25/19 [JG] Time first assessed: 0315 [JG] Present on Hospital Admission: Y [JG] Side: Left [JG] Location: coccyx [JG] Primary Wound Type: Pressure inj [JG] Stage, Pressure Injury: Stage 1 [JG] Recorded by:  [JG] Rona Ortega RN 10/25/19 0320    Row Name                Wound 10/25/19 0330 Right breast Soft Tissue Necrosis    Wound - Properties Group Date first assessed: 10/25/19 [JG] Time first assessed: 0330 [JG] Present on Hospital Admission: Y [JG] Side: Right [JG] Location: breast [JG] Primary Wound Type: Soft tissue [JG] Stage, Pressure Injury: other (see comments) [JG], fungagting, odorous.  Recorded by:  [JG] Rona Ortega RN 10/25/19 0820    Row Name                Wound 10/30/19 1712 Right breast Incision    Wound - Properties Group Date first assessed: 10/30/19 [SM] Time first assessed: 1712 [SM] Side: Right [SM] Location: breast [SM] Primary Wound Type: Incision [SM] Recorded by:  [SM] Hari Phillips RN 10/30/19 1712      User Key  (r) = Recorded By, (t) = Taken By, (c) = Cosigned By    Initials Name  Effective Dates Discipline    AF Chavez Verona Ana, OTR 04/03/18 -  OT    Rona Guthrie, RN 06/16/16 -  Nurse    SM Hari Phillips RN 06/16/16 -  Nurse        Wound 10/25/19 0315 Left coccyx Pressure Injury (Active)   Dressing Appearance dry;intact 11/6/2019  8:46 AM   Closure [place]ETTE 11/6/2019  8:46 AM   Base dressing in place, unable to visualize 11/6/2019  8:46 AM   Drainage Amount none 11/6/2019  8:46 AM   Dressing Care, Wound foam 11/6/2019  8:46 AM   Periwound Care, Wound dry periwound area maintained 11/5/2019  8:25 PM       Wound 10/25/19 0330 Right breast Soft Tissue Necrosis (Active)   Dressing Appearance open to air 11/6/2019  8:46 AM   Closure None 11/6/2019  8:46 AM   Base clean;dry 11/6/2019  8:46 AM   Drainage Amount none 11/6/2019  8:46 AM       Wound 10/30/19 1712 Right breast Incision (Active)   Dressing Appearance open to air 11/6/2019  8:46 AM   Closure [place] 11/6/2019  8:46 AM   Base clean;dry 11/6/2019  8:46 AM   Periwound dry;intact 11/6/2019  8:46 AM   Periwound Temperature warm 11/6/2019  8:46 AM   Periwound Skin Turgor soft 11/6/2019  8:46 AM   Drainage Amount none 11/6/2019  8:46 AM   Dressing Care, Wound open to air 11/6/2019  8:46 AM       Occupational Therapy Education     Title: PT OT SLP Therapies (In Progress)     Topic: Occupational Therapy (In Progress)     Point: ADL training (In Progress)     Description: Instruct learner(s) on proper safety adaptation and remediation techniques during self care or transfers.   Instruct in proper use of assistive devices.    Learning Progress Summary           Patient Acceptance, E,TB,D, NR by  at 11/4/2019  2:17 PM    Comment:  role of OT and POC. Safety for adl   Family Acceptance, E,TB,D, NR by  at 11/4/2019  2:17 PM    Comment:  role of OT and POC. Safety for adl                               User Key     Initials Effective Dates Name Provider Type Discipline     06/08/18 -  Nicolle Rosario OTR Occupational Therapist OT                 OT Recommendation and Plan        Outcome Measures     Row Name 11/06/19 1425 11/05/19 1100 11/04/19 1419       How much help from another is currently needed...    Putting on and taking off regular lower body clothing?  2  -AF  2  -SG  2  -SGA    Bathing (including washing, rinsing, and drying)  2  -AF  2  -SG  2  -SGA    Toileting (which includes using toilet bed pan or urinal)  2  -AF  2  -SG  2  -SGA    Putting on and taking off regular upper body clothing  3  -AF  3  -SG  3  -SGA    Taking care of personal grooming (such as brushing teeth)  3  -AF  3  -SG  3  -SGA    Eating meals  3  -AF  3  -SG  3  -SGA    AM-PAC 6 Clicks Score (OT)  15  -AF  15  -SG  15  -SGA       Functional Assessment    Outcome Measure Options  --  AM-PAC 6 Clicks Daily Activity (OT)  -SG  AM-PAC 6 Clicks Daily Activity (OT)  -SGA      User Key  (r) = Recorded By, (t) = Taken By, (c) = Cosigned By    Initials Name Provider Type    [facility] [place] [facility] Nicolle Rosario, OTR Occupational Therapist    SG Marisa Villalobos, OTR Occupational Therapist    Verona Rachel, OTR Occupational Therapist           Time Calculation:   Time Calculation- OT     Row Name 11/06/19 1425             Time Calculation- OT    OT Start Time  1349  -AF      OT Stop Time  1403  -AF      OT Time Calculation (min)  14 min  -AF      Total Timed Code Minutes- OT  14 minute(s)  -AF        User Key  (r) = Recorded By, (t) = Taken By, (c) = Cosigned By    Initials Name Provider Type    Verona Rachel, OTSCOT Occupational Therapist        Therapy Charges for Today     Code Description Service Date Service Provider Modifiers Qty    [id number]  OT THER PROC EA 15 MIN 11/6/2019 Verona Chavez OTR GO 1               FLORINA Suarez  11/6/2019

## 2019-11-06 NOTE — PLAN OF CARE
Problem: Patient Care Overview  Goal: Plan of Care Review  Outcome: Ongoing (interventions implemented as appropriate)   11/06/19 2316   Coping/Psychosocial   Plan of Care Reviewed With patient   OTHER   Outcome Summary Pt agreeable to participating in UB exs seated on the EOB with CGA. pt possibly d/c today or tomorrow to rehab.

## 2019-11-06 NOTE — PROGRESS NOTES
Name: Bonnie Mayberry ADMIT: 10/24/2019   : 1925  PCP: Patricio Maldonado MD    MRN: [id number] LOS: 12 days   AGE/SEX: 89 y.o. female  ROOM: [facility]   Subjective   Still with chronic right shoulder pain. Otherwise no new complaints.     Objective   Vital Signs  Temp:  [97.1 °F (36.2 °C)-97.7 °F (36.5 °C)] 97.1 °F (36.2 °C)  Heart Rate:  [75-83] 83  Resp:  [16] 16  BP: (128-131)/(65-73) 131/65  SpO2:  [97 %] 97 %  on   ;   Device (Oxygen Therapy): room air  Body mass index is 18.39 kg/m².    Physical Exam   Constitutional: She appears well-developed. No distress.   frail   HENT:   Head: Atraumatic.   Nose: Nose normal.   Eyes: Conjunctivae and EOM are normal.   Neck: Neck supple. No tracheal deviation present.   Cardiovascular: Normal rate and regular rhythm.   Pulmonary/Chest: Effort normal. She has no wheezes.   Abdominal: Soft. She exhibits no distension. There is no tenderness.   Musculoskeletal: She exhibits tenderness (r shoulder, deformity present). She exhibits no edema.   Neurological: She is alert. No cranial nerve deficit.   Skin: Skin is warm and dry. She is not diaphoretic.   Chest dressed, drain in place   Psychiatric: She has a normal mood and affect.   Nursing note and vitals reviewed.      Results Review:       I reviewed the patient's new clinical results.     I reviewed imaging, agree with interpretation.      Results from last 7 days   Lab Units 19  0552 19  0545 19  0659 10/31/19  0539   WBC 10*3/mm3 5.48 6.01 9.34 17.56*   HEMOGLOBIN g/dL 10.0* 9.3* 9.6* 10.1*   PLATELETS 10*3/mm3 348 283 332 298     Results from last 7 days   Lab Units 19  0558 19  0551 19  0601 19  0545   SODIUM mmol/L 138 138 136 132*   POTASSIUM mmol/L 4.9 4.5 3.1* 4.1   CHLORIDE mmol/L 99 103 98 96*   CO2 mmol/L 35.6* 28.4 30.2* 28.1   BUN mg/dL 13 7* 7* 7*   CREATININE mg/dL 0.77 0.65 0.60 0.64   GLUCOSE mg/dL 82 83 89 70   Estimated Creatinine Clearance: 32.7 mL/min  (by C-G formula based on SCr of 0.77 mg/dL).  Results from last 7 days   Lab Units 11/06/19  0558 11/04/19  0551 11/03/19  0601 11/02/19  0545  10/31/19  0539   CALCIUM mg/dL 8.3 8.2 7.8* 7.8*   < > 7.6*   MAGNESIUM mg/dL  --  2.1  --   --   --  2.0   PHOSPHORUS mg/dL 4.7*  --   --   --   --  3.3    < > = values in this interval not displayed.         calcium-vitamin D 1,000 mg Oral BID   cetirizine 5 mg Oral Daily   furosemide 20 mg Oral Daily   levothyroxine 137 mcg Oral Q AM   pantoprazole 40 mg Oral BID AC   phosphorus 500 mg Oral BID   potassium chloride 20 mEq Oral BID With Meals   sodium chloride 10 mL Intravenous Q12H       sodium chloride 30 mL/hr Last Rate: Stopped (10/28/19 1939)   Diet Soft Texture; Ground      Assessment/Plan      Active Hospital Problems    Diagnosis  POA   • **Anemia [D64.9]  Yes   • Acute pulmonary edema (CMS/HCC) [J81.0]  No   • PUD (peptic ulcer disease) [K27.9]  Yes   • DNR (do not resuscitate) [Z66]  No   • Hypokalemia [E87.6]  Yes   • Hyponatremia [E87.1]  Yes   • Breast cancer (CMS/HCC) [C50.919]  Yes   • Malignant neoplasm of central portion of right female breast (CMS/HCC) [C50.111]  Yes   • Dementia without behavioral disturbance (CMS/HCC) [F03.90]  Yes   • Hypothyroidism [E03.9]  Yes      Resolved Hospital Problems   No resolved problems to display.     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 Negative ER RI Her2 on 1 sample. + ER, -RI, equivocal her2 on 2nd. Breast surgery following. Awaiting second opinion on pathology though this will not delay discharge tomorrow.  · Anemia: Stable now  · Hyponatremia: Resolved  · Hypokalemia: Resolved  · Right shoulder pain: Old clavicle separation noted from previous orthopedic surgery clinic. She can follow up with Dr Ferrari if needed. Continue pain control.  · Hypothyroid: Synthroid  · PUD: PPI  · Weakness: Continue PT  · Disposition:  Discharge to [facility] tomorrow.    JAYDON Carter  [facility]  11/06/19  12:38 PM

## 2019-11-06 NOTE — DISCHARGE SUMMARY
Patient Name: Bonnie Mayberry  : 1925  MRN: [id number]    Date of Admission: 10/24/2019  Date of Discharge:  2019  Primary Care Physician: Patricio Maldonado MD      Chief Complaint:   Weakness - Generalized      Discharge Diagnoses     Active Hospital Problems    Diagnosis  POA   • **Anemia [D64.9]  Yes   • Fecal impaction (CMS/HCC) [K56.41]  Yes   • Acute pulmonary edema (CMS/HCC) [J81.0]  No   • PUD (peptic ulcer disease) [K27.9]  Yes   • DNR (do not resuscitate) [Z66]  No   • Hypokalemia [E87.6]  Yes   • Hyponatremia [E87.1]  Yes   • Breast cancer (CMS/HCC) [C50.919]  Yes   • Malignant neoplasm of central portion of right female breast (CMS/HCC) [C50.111]  Yes   • Dementia without behavioral disturbance (CMS/HCC) [F03.90]  Yes   • Hypothyroidism [E03.9]  Yes      Resolved Hospital Problems   No resolved problems to display.        Hospital Course     Ms. Mayberry is a 89 y.o. female with a history of arthritis (NSAID use) who presented to [facility] initially complaining of abnormal bleeding from her right breast and right shoulder pain.  Please see the admitting history and physical for further details.  She was found to have advanced right breast cancer with a fungating tumor and necrotic tissue and was admitted to the hospital for further evaluation and treatment.  She was also found to be severely anemic upon admission with a hemoglobin of 4.7.  She was given 4 units of packed red blood cells during hospital course with good response.  Gastroenterology saw in consultation as suspected anemia was due to NSAID use.  She underwent an EGD on 10/27/2019 that showed a a 10 cm hiatal hernia, nonbleeding PUD and gastritis.  She was continued on a proton pump inhibitor twice daily.  Breast surgery saw in consultation who performed a right total mastectomy without complication.  She will be discharged to [facility] under palliative care.    ADDENDUM 19: dc was delayed due to  poor bowel function. Patient had a bowel movement last night, abdominal pain has resolved. Bowel sounds present, abdomen soft and non distended.     Day of Discharge   No new complaints, ready to be discharged.     Denies chest pain, palpitations, SOA, edema, nausea, vomiting, fever and chills.    Physical Exam:  Temp:  [97.1 °F (36.2 °C)-98.4 °F (36.9 °C)] 97.1 °F (36.2 °C)  Heart Rate:  [71-85] 71  Resp:  [14-18] 18  BP: (127-132)/(70-74) 132/70  Body mass index is 18.39 kg/m².  Physical Exam   Constitutional: She is oriented to person, place, and time.   Elderly, frail, cachectic   HENT:   Head: Normocephalic and atraumatic.   Eyes: Conjunctivae and EOM are normal.   Neck: Normal range of motion. Neck supple.   Cardiovascular: Normal rate and regular rhythm.   Pulmonary/Chest: Effort normal and breath sounds normal. No respiratory distress.   Abdominal: Soft. Bowel sounds are normal. She exhibits no distension. There is no tenderness.   Musculoskeletal: Normal range of motion. She exhibits no edema.   Neurological: She is alert and oriented to person, place, and time.   Skin: Skin is warm and dry.   Right breast surgical incision with staples open to air.  CDI.   Psychiatric: She has a normal mood and affect. Her behavior is normal.   Nursing note and vitals reviewed.      Consultants     Consult Orders (all) (From admission, onward)    Start     Ordered    10/31/19 0752  Inpatient Hospice / [facility] Consult  Once     Specialty:  Hospice and Palliative Medicine  Provider:  (Not yet assigned)    10/31/19 0751    10/25/19 1141  Inpatient Breast Surgery Consult  Once,   Status:  Canceled     Specialty:  Breast Surgery  Provider:  Mart Wilson MD    10/25/19 1141    10/25/19 0159  Inpatient Gastroenterology Consult  Once     Specialty:  Gastroenterology  Provider:  Issa Rojo MD    10/25/19 0159        Procedures     BREAST MASTECTOMY    Imaging Results (All)     Procedure Component Value Units  Date/Time    XR Chest PA & Lateral [[id number]] Collected:  10/28/19 2125     Updated:  10/29/19 0406    Narrative:       PA AND LATERAL CHEST X-RAY     HISTORY: 89-year-old preoperative film; D64.9-Anemia, unspecified;  C50.511-Malignant neoplasm of lower-outer quadrant of right female  breast     COMPARISON: None.     FINDINGS: PA and lateral views of the chest were obtained. The lungs are  under aerated. There is diffuse vascular congestion and interstitial  prominence compatible with edema. There are right greater than left  pleural effusions. Heart is enlarged. Advanced arthritic change is  present in the shoulders.             Impression:       Mild CHF with right greater than left effusion.      This report was finalized on 10/29/2019 4:03 AM by Biju Huntley M.D.                     Pertinent Labs     Results from last 7 days   Lab Units 11/08/19 0624 11/04/19 0552 11/02/19  0545   WBC 10*3/mm3 6.12 5.48 6.01   HEMOGLOBIN g/dL 10.2* 10.0* 9.3*   PLATELETS 10*3/mm3 343 348 283     Results from last 7 days   Lab Units 11/08/19 0624 11/07/19 0527 11/06/19 0558 11/04/19  0551   SODIUM mmol/L 142 136 138 138   POTASSIUM mmol/L 4.8 4.6 4.9 4.5   CHLORIDE mmol/L 100 98 99 103   CO2 mmol/L 36.0* 33.7* 35.6* 28.4   BUN mg/dL 16 16 13 7*   CREATININE mg/dL 0.85 0.92 0.77 0.65   GLUCOSE mg/dL 86 86 82 83   Estimated Creatinine Clearance: 30.7 mL/min (by C-G formula based on SCr of 0.85 mg/dL).  Results from last 7 days   Lab Units 11/07/19 0527   ALBUMIN g/dL 2.30*   BILIRUBIN mg/dL 0.2   ALK PHOS U/L 61   AST (SGOT) U/L 12   ALT (SGPT) U/L 5     Results from last 7 days   Lab Units 11/08/19 0624 11/07/19 0527 11/06/19 0558 11/04/19  0551   CALCIUM mg/dL 8.4 8.3 8.3 8.2   ALBUMIN g/dL  --  2.30*  --   --    MAGNESIUM mg/dL  --   --   --  2.1   PHOSPHORUS mg/dL  --   --  4.7*  --      Results from last 7 days   Lab Units 11/07/19  0527   LIPASE U/L 25             Invalid input(s): LDLCALC        Test Results  Pending at Discharge   None   Order Current Status    Tissue Pathology Exam Preliminary result          Discharge Details        Discharge Medications      New Medications      Instructions Start Date   oxyCODONE-acetaminophen  MG per tablet  Commonly known as:  PERCOCET   1 tablet, Oral, Every 4 Hours PRN      pantoprazole 40 MG EC tablet  Commonly known as:  PROTONIX   40 mg, Oral, 2 Times Daily Before Meals      polyethylene glycol packet  Commonly known as:  MIRALAX   17 g, Oral, 2 Times Daily         Changes to Medications      Instructions Start Date   levothyroxine 137 MCG tablet  Commonly known as:  SYNTHROID, LEVOTHROID  What changed:    · medication strength  · how much to take   137 mcg, Oral, Daily         Continue These Medications      Instructions Start Date   diclofenac 1 % gel gel  Commonly known as:  VOLTAREN   APPLY 4 GRAMS ( A SMALL AMOUNT) TO AFFECTED AREA(S) TWO TIMES A DAY      loratadine 10 MG tablet  Commonly known as:  CLARITIN   1 tablet, Oral, Daily         Stop These Medications    ibuprofen 600 MG tablet  Commonly known as:  ADVIL,MOTRIN            Allergies   Allergen Reactions   • Penicillins Unknown (See Comments)     .   • Sulfa Antibiotics Unknown (See Comments)     .           Discharge Disposition:  Skilled Nursing Facility (DC - External)    Discharge Diet:  Diet Order   Procedures   • Diet Soft Texture; Ground       Discharge Activity:   Activity Instructions     Activity as Tolerated            CODE STATUS:    Code Status and Medical Interventions:   Ordered at: 10/28/19 1144     Limited Support to NOT Include:    Intubation    Cardioversion/Defibrillation     Code Status:    No CPR     Medical Interventions (Level of Support Prior to Arrest):    Limited       Future Appointments   Date Time Provider Department Center   11/14/2019  2:15 PM Mart Wilson MD [facility] None     Additional Instructions for the Follow-ups that You Need to Schedule     Discharge  Follow-up with Specified Provider: Dr Wilson as scheduled; 2 Weeks   As directed      To:  Dr Wilson as scheduled    Follow Up:  2 Weeks            Contact information for follow-up providers     Patricio Maldonado MD .    Specialty:  Family Medicine  Contact information:  [address] [facility] [address] [phone]                   Contact information for after-discharge care     Destination     [facility] .    Service:  Skilled Nursing  Contact information:  [address] [facility] [address] [phone]                             Additional Instructions for the Follow-ups that You Need to Schedule     Discharge Follow-up with Specified Provider: Dr Wilson as scheduled; 2 Weeks   As directed      To:  Dr Wilson as scheduled    Follow Up:  2 Weeks           Time Spent on Discharge:  Greater than 30 minutes      JAYDON Carter  [facility]  11/08/19  1:28 PM      I have reviewed the above note and made adjustments to include current meds and addendum to hospital course.    Rich Bryson MD  11/08/19  3:15 PM

## 2019-11-06 NOTE — TELEPHONE ENCOUNTER
Referral received from Dr. Wilson's office. Called MsSushila Leslieruchi and left a message introducing myself and navigational services. Asked her to call me back at her convenience and left my contact information

## 2019-11-06 NOTE — PLAN OF CARE
Problem: Patient Care Overview  Goal: Plan of Care Review  Outcome: Ongoing (interventions implemented as appropriate)   11/06/19 0496   Coping/Psychosocial   Plan of Care Reviewed With patient   Plan of Care Review   Progress improving   OTHER   Outcome Summary Pt tolerated treatment well this date. Improved success noted, requiring less assist and able to ambulate 25ft w/ Rw. Pt more steady, not shaking as much when up. PT will continue to address functional mobility deficits as tolerated.

## 2019-11-06 NOTE — PLAN OF CARE
Problem: Skin Injury Risk (Adult)  Goal: Skin Health and Integrity  Outcome: Outcome(s) achieved Date Met: 11/06/19      Problem: Fall Risk (Adult)  Goal: Absence of Fall  Outcome: Outcome(s) achieved Date Met: 11/06/19      Problem: Anemia (Adult)  Goal: Symptom Improvement  Outcome: Outcome(s) achieved Date Met: 11/06/19      Problem: Patient Care Overview  Goal: Plan of Care Review  Outcome: Ongoing (interventions implemented as appropriate)   11/06/19 0420   Coping/Psychosocial   Plan of Care Reviewed With patient   Plan of Care Review   Progress no change   OTHER   Outcome Summary pt slept well tonight, no complaints of pain, Pure wick in place and working well. q2 turns. but pt refused some turns. pt going to [facility] for palliative and rehab today. will continye to monitor and keep comfortable      Goal: Individualization and Mutuality  Outcome: Ongoing (interventions implemented as appropriate)   11/06/19 0420   Individualization   Patient Specific Goals (Include Timeframe) to keep comfortable    Patient Specific Interventions PRN pain meds, q4 turns       Problem: Wound (Includes Pressure Injury) (Adult)  Goal: Signs and Symptoms of Listed Potential Problems Will be Absent, Minimized or Managed (Wound)  Outcome: Outcome(s) achieved Date Met: 11/06/19      Problem: Pain, Acute (Adult)  Goal: Acceptable Pain Control/Comfort Level  Outcome: Outcome(s) achieved Date Met: 11/06/19

## 2019-11-06 NOTE — PLAN OF CARE
Problem: Patient Care Overview  Goal: Plan of Care Review  Outcome: Ongoing (interventions implemented as appropriate)   11/06/19 4365   Coping/Psychosocial   Plan of Care Reviewed With patient   Plan of Care Review   Progress no change   OTHER   Outcome Summary Pt had minimal c/o pain during shift. PO pain meds given x1, pt has no c/o pain so far. VSS. Awaiting drain removal before pt can be transferred to rehab facilty. Rehab facility ready for pt to transfer. Incision is c/d/i w/ slight pink. Family at bedside. Pt oob to chair and to bsc today with assist. Will continue to monitor. Plan for d/c in AM.      Goal: Individualization and Mutuality  Outcome: Ongoing (interventions implemented as appropriate)    Goal: Discharge Needs Assessment  Outcome: Ongoing (interventions implemented as appropriate)    Goal: Interprofessional Rounds/Family Conf  Outcome: Ongoing (interventions implemented as appropriate)      Problem: Palliative Care (Adult)  Goal: Maximized Comfort  Outcome: Ongoing (interventions implemented as appropriate)    Goal: Enhanced Quality of Life  Outcome: Ongoing (interventions implemented as appropriate)

## 2019-11-06 NOTE — PROGRESS NOTES
Discharge Planning Assessment  [facility]     Patient Name: Bonnie Mayberry  MRN: [id number]  Today's Date: 11/6/2019    Admit Date: 10/24/2019    Discharge Needs Assessment    No documentation.       Discharge Plan     Row Name 11/06/19 1626       Plan    Plan Comments  Spoke with Nicolle DOE/[facility] and they have been waiting on her all day and per Dr. Faith if they patient could come before 18:00 today or in the AM she would appreciate this. RN called Dr. Wilson and he is in surgery. Per A the patient can be discharged when Dr. Wilson rounds and removes the TENZIN drain. The family plans on transporting the patient to the facility. NADER Guo RN, CCP.         Destination - Selection Complete      Service Provider Request Status Selected Services Address Phone Number Fax Number    [facility] Selected Skilled Nursing [address] [address] [phone]    [facility] Pending - Request Sent N/A [address] [address] [phone]      Durable Medical Equipment      No service coordination in this encounter.      Dialysis/Infusion      No service coordination in this encounter.      Home Medical Care      No service coordination in this encounter.      Therapy      No service coordination in this encounter.      Community Resources      No service coordination in this encounter.        Expected Discharge Date and Time     Expected Discharge Date Expected Discharge Time    Nov 6, 2019         Demographic Summary    No documentation.       Functional Status    No documentation.       Psychosocial    No documentation.       Abuse/Neglect    No documentation.       Legal    No documentation.       Substance Abuse    No documentation.       Patient Forms    No documentation.           Pati Guo RN

## 2019-11-06 NOTE — NURSING NOTE
Attempted to contact Dr. Wilson about drain removal, contacted office twice. States that text messages have been sent to MD, still awaiting call back at this time.     ** Spoke w/ MD and given order to remove TENZIN drain**

## 2019-11-07 PROBLEM — K56.41 FECAL IMPACTION (HCC): Status: ACTIVE | Noted: 2019-01-01

## 2019-11-07 NOTE — PROGRESS NOTES
Palliative Care Sw followed up with patient and family. Family reported that patient had been experiencing intense, severe abdominal pain. Patient required multiple doses of IV morphine to better control pain. Per family, CT was ordered determine cause of pain to ensure that patient will be able to have appropriate pain management at facility prior to d/c. Patient's son discussed that patient has been experiencing pain when getting blood pressure taken as well. Patient currently denying pain since getting morphine. Patient was engaging and calm during discussion. Provided psychosocial support. Continued GOC discussion. Patient and family goals are still comfort and symptom management.

## 2019-11-07 NOTE — PLAN OF CARE
Problem: Patient Care Overview  Goal: Plan of Care Review  Outcome: Ongoing (interventions implemented as appropriate)   11/07/19 0434   Coping/Psychosocial   Plan of Care Reviewed With patient   Plan of Care Review   Progress no change   OTHER   Outcome Summary pt slept well tonight. complaint of pain x1 gave a percocet. tolerated well. pt is being DC today to [facility] for palliative and rehab care. Family is to drive her there. pt is a q2 turn. but refused some turns. Per MD staples are to be removed in 7 to 10 days. will continue to monitor and keep comfortable      Goal: Individualization and Mutuality  Outcome: Ongoing (interventions implemented as appropriate)   11/06/19 0420   Individualization   Patient Specific Goals (Include Timeframe) to keep comfortable    Patient Specific Interventions PRN pain meds, q4 turns       Problem: Palliative Care (Adult)  Goal: Maximized Comfort  Outcome: Ongoing (interventions implemented as appropriate)    Goal: Enhanced Quality of Life  Outcome: Ongoing (interventions implemented as appropriate)

## 2019-11-07 NOTE — PROGRESS NOTES
Discharge Planning Assessment  [facility]     Patient Name: Bonnie Mayberry  MRN: [id number]  Today's Date: 11/7/2019    Admit Date: 10/24/2019    Discharge Needs Assessment    No documentation.       Discharge Plan     Row Name 11/07/19 1643       Plan    Plan  [facility], skilled palliative and the family will provide transportation     Plan Comments  Spoke with JAYDON/[facility] and she states if the patient has a bowel movement discharge will be tomorrow. Called Nicolle DOE/[facility] and she is aware and will follow up in the AM. [facility], skilled palliative and the family will provide transportation  NADER Guo RN, CCP.         Destination - Selection Complete      Service Provider Request Status Selected Services Address Phone Number Fax Number    [facility] Selected Skilled Nursing [address] [address] [phone]    [facility] Pending - Request Sent N/A [address] [address] [phone]      Durable Medical Equipment      No service coordination in this encounter.      Dialysis/Infusion      No service coordination in this encounter.      Home Medical Care      No service coordination in this encounter.      Therapy      No service coordination in this encounter.      Community Resources      No service coordination in this encounter.        Expected Discharge Date and Time     Expected Discharge Date Expected Discharge Time    Nov 6, 2019         Demographic Summary    No documentation.       Functional Status    No documentation.       Psychosocial    No documentation.       Abuse/Neglect    No documentation.       Legal    No documentation.       Substance Abuse    No documentation.       Patient Forms    No documentation.           Pati Guo RN

## 2019-11-07 NOTE — SIGNIFICANT NOTE
11/07/19 0821   PT Discharge Summary   Reason for Discharge Discharge from facility   Discharge Destination SNF

## 2019-11-07 NOTE — PLAN OF CARE
Problem: Patient Care Overview  Goal: Plan of Care Review  Outcome: Ongoing (interventions implemented as appropriate)   11/07/19 3640   Coping/Psychosocial   Plan of Care Reviewed With patient   Plan of Care Review   Progress no change   OTHER   Outcome Summary Pt A&Ox4, c/o severe abdominal pain -- CT showed pt was impacted. Gave pt bowel regimen, if unsuccessful pt with need soap sods enema and possibly disimpaction. Pt needs to have BM prior to D/C to [facility].

## 2019-11-07 NOTE — PROGRESS NOTES
Name: Bonnie Mayberry ADMIT: 10/24/2019   : 1925  PCP: Patricio Maldonado MD    MRN: [id number] LOS: 13 days   AGE/SEX: 89 y.o. female  ROOM: [facility]   Subjective   Still with chronic right shoulder pain. Otherwise no new complaints. Ready to go to rehab.    denies chest pain, palpitations, SOA, fever, chills, nausea and vomiting    Objective   Vital Signs  Temp:  [97.4 °F (36.3 °C)-97.7 °F (36.5 °C)] 97.7 °F (36.5 °C)  Heart Rate:  [73-85] 73  Resp:  [16] 16  BP: (110-129)/(57-82) 110/57  SpO2:  [95 %-96 %] 95 %  on   ;   Device (Oxygen Therapy): room air  Body mass index is 18.39 kg/m².    Physical Exam   Constitutional: She appears well-developed. No distress.   frail   HENT:   Head: Atraumatic.   Nose: Nose normal.   Eyes: Conjunctivae and EOM are normal.   Neck: Neck supple. No tracheal deviation present.   Cardiovascular: Normal rate and regular rhythm.   Pulmonary/Chest: Effort normal. She has no wheezes.   Abdominal: Soft. She exhibits no distension. There is no tenderness.   Musculoskeletal: She exhibits tenderness (r shoulder, deformity present). She exhibits no edema.   Neurological: She is alert. No cranial nerve deficit.   Skin: Skin is warm and dry. She is not diaphoretic.   Chest dressed, drain in place   Psychiatric: She has a normal mood and affect.   Nursing note and vitals reviewed.      Results Review:       I reviewed the patient's new clinical results.     I reviewed imaging, agree with interpretation.      Results from last 7 days   Lab Units 19  0552 19  0545 19  0659   WBC 10*3/mm3 5.48 6.01 9.34   HEMOGLOBIN g/dL 10.0* 9.3* 9.6*   PLATELETS 10*3/mm3 348 283 332     Results from last 7 days   Lab Units 19  0527 19  0558 19  0551 19  0601   SODIUM mmol/L 136 138 138 136   POTASSIUM mmol/L 4.6 4.9 4.5 3.1*   CHLORIDE mmol/L 98 99 103 98   CO2 mmol/L 33.7* 35.6* 28.4 30.2*   BUN mg/dL 16 13 7* 7*   CREATININE mg/dL 0.92 0.77 0.65 0.60    GLUCOSE mg/dL 86 82 83 89   Estimated Creatinine Clearance: 28.4 mL/min (by C-G formula based on SCr of 0.92 mg/dL).  Results from last 7 days   Lab Units 11/07/19  0527 11/06/19  0558 11/04/19  0551 11/03/19  0601   CALCIUM mg/dL 8.3 8.3 8.2 7.8*   ALBUMIN g/dL 2.30*  --   --   --    MAGNESIUM mg/dL  --   --  2.1  --    PHOSPHORUS mg/dL  --  4.7*  --   --          calcium-vitamin D 1,000 mg Oral BID   cetirizine 5 mg Oral Daily   furosemide 20 mg Oral Daily   levothyroxine 137 mcg Oral Q AM   pantoprazole 40 mg Oral BID AC   phosphorus 500 mg Oral BID   potassium chloride 20 mEq Oral BID With Meals   sodium chloride 10 mL Intravenous Q12H       sodium chloride 30 mL/hr Last Rate: Stopped (10/28/19 1939)   Diet Soft Texture; Ground      Assessment/Plan      Active Hospital Problems    Diagnosis  POA   • **Anemia [D64.9]  Yes   • Acute pulmonary edema (CMS/HCC) [J81.0]  No   • PUD (peptic ulcer disease) [K27.9]  Yes   • DNR (do not resuscitate) [Z66]  No   • Hypokalemia [E87.6]  Yes   • Hyponatremia [E87.1]  Yes   • Breast cancer (CMS/HCC) [C50.919]  Yes   • Malignant neoplasm of central portion of right female breast (CMS/HCC) [C50.111]  Yes   • Dementia without behavioral disturbance (CMS/HCC) [F03.90]  Yes   • Hypothyroidism [E03.9]  Yes      Resolved Hospital Problems   No resolved problems to display.     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 Negative ER HI Her2 on 1 sample. + ER, -HI, equivocal her2 on 2nd. Breast surgery following. Awaiting second opinion on pathology though this will not delay discharge tomorrow.  · Anemia: Stable now  · Hyponatremia: Resolved  · Hypokalemia: Resolved  · Right shoulder pain: Old clavicle separation noted from previous orthopedic surgery clinic. She can follow up with Dr Ferrari if needed. Continue pain control.  · Hypothyroid: Synthroid  · PUD: PPI  · Weakness: Continue  PT  · [facility].    JAYDON Carter  [facility]  11/07/19  10:56 AM

## 2019-11-08 NOTE — PLAN OF CARE
Problem: Patient Care Overview  Goal: Plan of Care Review  Outcome: Ongoing (interventions implemented as appropriate)   11/08/19 0332   Coping/Psychosocial   Plan of Care Reviewed With patient   Plan of Care Review   Progress improving   OTHER   Outcome Summary Pt VSS, alert and oriented. Pt given bowel regimen on previous shift for fecal impaction and heavy stool burden. Pt able to have very large bowel movement that was formed on BSC. Pt has no c/o pain or discomfort. [facility]. Will continue to monitor.      Goal: Individualization and Mutuality  Outcome: Ongoing (interventions implemented as appropriate)    Goal: Discharge Needs Assessment  Outcome: Ongoing (interventions implemented as appropriate)    Goal: Interprofessional Rounds/Family Conf  Outcome: Ongoing (interventions implemented as appropriate)      Problem: Palliative Care (Adult)  Goal: Maximized Comfort  Outcome: Ongoing (interventions implemented as appropriate)    Goal: Enhanced Quality of Life  Outcome: Ongoing (interventions implemented as appropriate)

## 2019-11-08 NOTE — PROGRESS NOTES
Continued Stay Note  [facility]     Patient Name: Bonnie Mayberry  MRN: [id number]  Today's Date: 11/8/2019    Admit Date: 10/24/2019    Discharge Plan     Row Name 11/08/19 1414       Plan    Plan  Skilled, palliative bed at [facility]; family will transport.      Patient/Family in Agreement with Plan  yes    Plan Comments  Per Nicolle Resendiz the bed is still available for the patient at [facility] and the family will transport the patient.  CCP will follow to assist with the patient's d/c to a skilled palliative bed at [facility] and will be transported by her family.  MICHAEL Wang        Discharge Codes    No documentation.       Expected Discharge Date and Time     Expected Discharge Date Expected Discharge Time    Nov 8, 2019             MICHAEL Abraham

## 2019-11-08 NOTE — PROGRESS NOTES
Case Management Discharge Note    Final Note: The patient was discharged to a skilled, palliative bed at [facility] and will be transported by her family.  MICHAEL Wang    Destination - Selection Complete      Service Provider Request Status Selected Services Address Phone Number Fax Number    [facility] Selected Skilled Nursing [address] [address] [phone]      Durable Medical Equipment      No service has been selected for the patient.      Dialysis/Infusion      No service has been selected for the patient.      Home Medical Care      No service has been selected for the patient.      Therapy      No service has been selected for the patient.      Community Resources      No service has been selected for the patient.        Transportation Services  Private: Car    Final Discharge Disposition Code: 03 - skilled nursing facility (SNF)

## 2019-11-14 NOTE — OUTREACH NOTE
Care Coordination Note  Talked with Judit/ [facility] [phone]. She states patient remains at facility receiving SN, PT, OT services. No discharge date at this time.     Lucía Castro RN  Community Care Coordinator    11/14/2019, 11:12 AM

## 2019-12-11 ENCOUNTER — EPISODE CHANGES (OUTPATIENT)
Dept: CASE MANAGEMENT | Facility: OTHER | Age: 84
End: 2019-12-11

## 2019-12-20 ENCOUNTER — PATIENT OUTREACH (OUTPATIENT)
Dept: CASE MANAGEMENT | Facility: OTHER | Age: 84
End: 2019-12-20

## 2019-12-20 NOTE — OUTREACH NOTE
Care Coordination Note  Confirmed with [facility] [phone], patient .      Lucía Castro, RN  Ambulatory     2019, 11:00 AM

## (undated) DEVICE — FRCP BX RADJAW4 NDL 2.8 240CM LG OG BX40

## (undated) DEVICE — PENCL E/S HNDSWTCH SMOKEEVAC HOLSTR 10FT

## (undated) DEVICE — SYS PERFUS SEP PLATLT W TIPS CUST

## (undated) DEVICE — Device

## (undated) DEVICE — TUBING, SUCTION, 1/4" X 20', STRAIGHT: Brand: MEDLINE INDUSTRIES, INC.

## (undated) DEVICE — TUBING, SUCTION, 1/4" X 10', STRAIGHT: Brand: MEDLINE

## (undated) DEVICE — SUT VIC 2/0 TIES 18IN J111T

## (undated) DEVICE — BITEBLOCK OMNI BLOC

## (undated) DEVICE — SKIN PREP TRAY W/CHG: Brand: MEDLINE INDUSTRIES, INC.

## (undated) DEVICE — ANTIBACTERIAL UNDYED BRAIDED (POLYGLACTIN 910), SYNTHETIC ABSORBABLE SUTURE: Brand: COATED VICRYL

## (undated) DEVICE — GLV SURG PREMIERPRO ORTHO LTX PF SZ8 BRN

## (undated) DEVICE — GLV SURG BIOGEL LTX PF 6 1/2

## (undated) DEVICE — PK CHST BRST 40

## (undated) DEVICE — KT VLV BIOGUARD SXN BIOP AIR/H20 CONN 4PC DISP

## (undated) DEVICE — SUT VIC 3/0 TIES 18IN J110T

## (undated) DEVICE — ELECTRD BLD EDGE/INSUL1P 2.4X5.1MM STRL

## (undated) DEVICE — BNDG ELAS CO-FLEX SLF ADHR 6IN 5YD LF STRL

## (undated) DEVICE — GOWN,SIRUS,NON REINFRCD,LARGE,SET IN SL: Brand: MEDLINE

## (undated) DEVICE — SENSR O2 OXIMAX FNGR A/ 18IN NONSTR

## (undated) DEVICE — JACKSON-PRATT 100CC BULB RESERVOIR: Brand: CARDINAL HEALTH

## (undated) DEVICE — CANN NASL CO2 TRULINK W/O2 A/

## (undated) DEVICE — STPLR SKIN VISISTAT WD 35CT

## (undated) DEVICE — DRSNG TELFA PAD NONADH STR 1S 3X4IN

## (undated) DEVICE — 1010 S-DRAPE TOWEL DRAPE 10/BX: Brand: STERI-DRAPE™

## (undated) DEVICE — SPNG GZ WOVN 4X4IN 12PLY 10/BX STRL